# Patient Record
Sex: MALE | Race: WHITE | NOT HISPANIC OR LATINO | Employment: OTHER | URBAN - METROPOLITAN AREA
[De-identification: names, ages, dates, MRNs, and addresses within clinical notes are randomized per-mention and may not be internally consistent; named-entity substitution may affect disease eponyms.]

---

## 2017-01-01 ENCOUNTER — APPOINTMENT (OUTPATIENT)
Dept: GENERAL RADIOLOGY | Facility: HOSPITAL | Age: 82
End: 2017-01-01

## 2017-01-01 ENCOUNTER — OFFICE VISIT (OUTPATIENT)
Dept: ONCOLOGY | Facility: CLINIC | Age: 82
End: 2017-01-01

## 2017-01-01 ENCOUNTER — APPOINTMENT (OUTPATIENT)
Dept: ONCOLOGY | Facility: HOSPITAL | Age: 82
End: 2017-01-01

## 2017-01-01 ENCOUNTER — DOCUMENTATION (OUTPATIENT)
Dept: INTERNAL MEDICINE | Facility: HOSPITAL | Age: 82
End: 2017-01-01

## 2017-01-01 ENCOUNTER — INFUSION (OUTPATIENT)
Dept: ONCOLOGY | Facility: HOSPITAL | Age: 82
End: 2017-01-01

## 2017-01-01 ENCOUNTER — LAB (OUTPATIENT)
Dept: ONCOLOGY | Facility: HOSPITAL | Age: 82
End: 2017-01-01

## 2017-01-01 ENCOUNTER — TELEPHONE (OUTPATIENT)
Dept: INTERNAL MEDICINE | Facility: CLINIC | Age: 82
End: 2017-01-01

## 2017-01-01 ENCOUNTER — DOCUMENTATION (OUTPATIENT)
Dept: ONCOLOGY | Facility: CLINIC | Age: 82
End: 2017-01-01

## 2017-01-01 ENCOUNTER — HOSPITAL ENCOUNTER (OUTPATIENT)
Dept: CT IMAGING | Facility: HOSPITAL | Age: 82
Discharge: HOME | End: 2017-03-10
Admitting: NURSE PRACTITIONER

## 2017-01-01 ENCOUNTER — HOSPITAL ENCOUNTER (INPATIENT)
Facility: HOSPITAL | Age: 82
LOS: 1 days | End: 2017-05-05
Attending: EMERGENCY MEDICINE | Admitting: INTERNAL MEDICINE

## 2017-01-01 ENCOUNTER — TELEPHONE (OUTPATIENT)
Dept: ONCOLOGY | Facility: CLINIC | Age: 82
End: 2017-01-01

## 2017-01-01 VITALS
WEIGHT: 141.8 LBS | TEMPERATURE: 98.3 F | SYSTOLIC BLOOD PRESSURE: 128 MMHG | HEART RATE: 76 BPM | DIASTOLIC BLOOD PRESSURE: 82 MMHG | RESPIRATION RATE: 16 BRPM | BODY MASS INDEX: 21 KG/M2 | HEIGHT: 69 IN

## 2017-01-01 VITALS
BODY MASS INDEX: 21.03 KG/M2 | HEART RATE: 90 BPM | DIASTOLIC BLOOD PRESSURE: 77 MMHG | WEIGHT: 142 LBS | TEMPERATURE: 97.6 F | RESPIRATION RATE: 18 BRPM | OXYGEN SATURATION: 98 % | HEIGHT: 69 IN | SYSTOLIC BLOOD PRESSURE: 127 MMHG

## 2017-01-01 VITALS
WEIGHT: 140 LBS | OXYGEN SATURATION: 65 % | HEIGHT: 70 IN | HEART RATE: 60 BPM | BODY MASS INDEX: 20.04 KG/M2 | SYSTOLIC BLOOD PRESSURE: 105 MMHG | RESPIRATION RATE: 20 BRPM | TEMPERATURE: 99.1 F | DIASTOLIC BLOOD PRESSURE: 49 MMHG

## 2017-01-01 VITALS
HEIGHT: 69 IN | SYSTOLIC BLOOD PRESSURE: 108 MMHG | BODY MASS INDEX: 20.88 KG/M2 | DIASTOLIC BLOOD PRESSURE: 70 MMHG | WEIGHT: 141 LBS | OXYGEN SATURATION: 95 % | HEART RATE: 84 BPM | RESPIRATION RATE: 20 BRPM | TEMPERATURE: 97.6 F

## 2017-01-01 VITALS
BODY MASS INDEX: 21.55 KG/M2 | HEART RATE: 85 BPM | DIASTOLIC BLOOD PRESSURE: 82 MMHG | TEMPERATURE: 98.2 F | SYSTOLIC BLOOD PRESSURE: 134 MMHG | RESPIRATION RATE: 16 BRPM | HEIGHT: 69 IN | WEIGHT: 145.5 LBS

## 2017-01-01 VITALS
HEIGHT: 69 IN | RESPIRATION RATE: 18 BRPM | SYSTOLIC BLOOD PRESSURE: 147 MMHG | HEART RATE: 73 BPM | TEMPERATURE: 97.4 F | OXYGEN SATURATION: 95 % | BODY MASS INDEX: 21.37 KG/M2 | DIASTOLIC BLOOD PRESSURE: 92 MMHG | WEIGHT: 144.3 LBS

## 2017-01-01 VITALS
BODY MASS INDEX: 21.15 KG/M2 | HEART RATE: 81 BPM | DIASTOLIC BLOOD PRESSURE: 80 MMHG | OXYGEN SATURATION: 90 % | HEIGHT: 69 IN | SYSTOLIC BLOOD PRESSURE: 132 MMHG | TEMPERATURE: 97.3 F | WEIGHT: 142.8 LBS | RESPIRATION RATE: 16 BRPM

## 2017-01-01 VITALS — WEIGHT: 140.2 LBS | BODY MASS INDEX: 20.69 KG/M2

## 2017-01-01 VITALS
SYSTOLIC BLOOD PRESSURE: 128 MMHG | DIASTOLIC BLOOD PRESSURE: 76 MMHG | WEIGHT: 141.8 LBS | TEMPERATURE: 97.8 F | HEART RATE: 88 BPM | BODY MASS INDEX: 20.93 KG/M2

## 2017-01-01 DIAGNOSIS — C34.91 NON-SMALL CELL CANCER OF RIGHT LUNG (HCC): Primary | ICD-10-CM

## 2017-01-01 DIAGNOSIS — D64.9 ANEMIA, UNSPECIFIED TYPE: Primary | ICD-10-CM

## 2017-01-01 DIAGNOSIS — G89.3 CANCER ASSOCIATED PAIN: ICD-10-CM

## 2017-01-01 DIAGNOSIS — C34.91 NON-SMALL CELL CANCER OF RIGHT LUNG (HCC): ICD-10-CM

## 2017-01-01 DIAGNOSIS — C34.91 MALIGNANT NEOPLASM OF RIGHT LUNG, UNSPECIFIED PART OF LUNG (HCC): ICD-10-CM

## 2017-01-01 DIAGNOSIS — C34.11 MALIGNANT NEOPLASM OF UPPER LOBE OF RIGHT LUNG (HCC): ICD-10-CM

## 2017-01-01 DIAGNOSIS — C34.11 MALIGNANT NEOPLASM OF UPPER LOBE OF RIGHT LUNG (HCC): Primary | ICD-10-CM

## 2017-01-01 DIAGNOSIS — J96.21 ACUTE ON CHRONIC RESPIRATORY FAILURE WITH HYPOXIA (HCC): Primary | ICD-10-CM

## 2017-01-01 DIAGNOSIS — R53.82 CHRONIC FATIGUE: ICD-10-CM

## 2017-01-01 DIAGNOSIS — C61 MALIGNANT NEOPLASM OF PROSTATE (HCC): Primary | ICD-10-CM

## 2017-01-01 DIAGNOSIS — R19.7 DIARRHEA, UNSPECIFIED TYPE: Primary | ICD-10-CM

## 2017-01-01 DIAGNOSIS — D64.9 ANEMIA, UNSPECIFIED TYPE: ICD-10-CM

## 2017-01-01 DIAGNOSIS — R63.4 UNINTENTIONAL WEIGHT LOSS: ICD-10-CM

## 2017-01-01 DIAGNOSIS — J18.9 PNEUMONIA OF LEFT LOWER LOBE DUE TO INFECTIOUS ORGANISM: ICD-10-CM

## 2017-01-01 DIAGNOSIS — R79.1 ABNORMAL COAGULATION PROFILE: ICD-10-CM

## 2017-01-01 LAB
ALBUMIN SERPL-MCNC: 3.6 G/DL (ref 3.5–5.2)
ALBUMIN SERPL-MCNC: 3.6 G/DL (ref 3.5–5.2)
ALBUMIN SERPL-MCNC: 3.7 G/DL (ref 3.5–5.2)
ALBUMIN SERPL-MCNC: 3.8 G/DL (ref 3.5–5.2)
ALBUMIN SERPL-MCNC: 3.9 G/DL (ref 3.5–5.2)
ALBUMIN SERPL-MCNC: 4.1 G/DL (ref 3.5–5.2)
ALBUMIN/GLOB SERPL: 0.9 G/DL
ALBUMIN/GLOB SERPL: 1 G/DL
ALBUMIN/GLOB SERPL: 1 G/DL (ref 1.1–2.4)
ALBUMIN/GLOB SERPL: 1.1 G/DL
ALP SERPL-CCNC: 65 U/L (ref 39–117)
ALP SERPL-CCNC: 82 U/L (ref 39–117)
ALP SERPL-CCNC: 84 U/L (ref 39–117)
ALP SERPL-CCNC: 86 U/L (ref 39–117)
ALP SERPL-CCNC: 87 U/L (ref 38–116)
ALP SERPL-CCNC: 87 U/L (ref 39–117)
ALP SERPL-CCNC: 89 U/L (ref 39–117)
ALP SERPL-CCNC: 90 U/L (ref 39–117)
ALT SERPL W P-5'-P-CCNC: 11 U/L (ref 1–41)
ALT SERPL W P-5'-P-CCNC: 13 U/L (ref 0–41)
ALT SERPL W P-5'-P-CCNC: 13 U/L (ref 1–41)
ALT SERPL W P-5'-P-CCNC: 14 U/L (ref 1–41)
ALT SERPL W P-5'-P-CCNC: 15 U/L (ref 1–41)
ALT SERPL W P-5'-P-CCNC: 15 U/L (ref 1–41)
ANION GAP SERPL CALCULATED.3IONS-SCNC: 11 MMOL/L
ANION GAP SERPL CALCULATED.3IONS-SCNC: 11.1 MMOL/L
ANION GAP SERPL CALCULATED.3IONS-SCNC: 12.7 MMOL/L
ANION GAP SERPL CALCULATED.3IONS-SCNC: 13.3 MMOL/L
ANION GAP SERPL CALCULATED.3IONS-SCNC: 13.9 MMOL/L
ANION GAP SERPL CALCULATED.3IONS-SCNC: 16.3 MMOL/L
ANION GAP SERPL CALCULATED.3IONS-SCNC: 8.6 MMOL/L
ANION GAP SERPL CALCULATED.3IONS-SCNC: 9.8 MMOL/L
ARTERIAL PATENCY WRIST A: POSITIVE
ARTERIAL PATENCY WRIST A: POSITIVE
AST SERPL-CCNC: 13 U/L (ref 1–40)
AST SERPL-CCNC: 14 U/L (ref 0–40)
AST SERPL-CCNC: 15 U/L (ref 1–40)
AST SERPL-CCNC: 16 U/L (ref 1–40)
AST SERPL-CCNC: 18 U/L (ref 1–40)
AST SERPL-CCNC: 20 U/L (ref 1–40)
AST SERPL-CCNC: 20 U/L (ref 1–40)
AST SERPL-CCNC: 21 U/L (ref 1–40)
ATMOSPHERIC PRESS: 738.8 MMHG
ATMOSPHERIC PRESS: 738.8 MMHG
BASE EXCESS BLDA CALC-SCNC: -2 MMOL/L (ref 0–2)
BASE EXCESS BLDA CALC-SCNC: 1.3 MMOL/L (ref 0–2)
BASOPHILS # BLD AUTO: 0.03 10*3/MM3 (ref 0–0.2)
BASOPHILS # BLD AUTO: 0.04 10*3/MM3 (ref 0–0.2)
BASOPHILS # BLD AUTO: 0.05 10*3/MM3 (ref 0–0.2)
BASOPHILS # BLD AUTO: 0.06 10*3/MM3 (ref 0–0.1)
BASOPHILS # BLD AUTO: 0.08 10*3/MM3 (ref 0–0.2)
BASOPHILS # BLD AUTO: 0.1 10*3/MM3 (ref 0–0.2)
BASOPHILS NFR BLD AUTO: 0.2 % (ref 0–1.5)
BASOPHILS NFR BLD AUTO: 0.3 % (ref 0–1.5)
BASOPHILS NFR BLD AUTO: 0.5 % (ref 0–1.5)
BASOPHILS NFR BLD AUTO: 0.6 % (ref 0–1.5)
BASOPHILS NFR BLD AUTO: 0.7 % (ref 0–1.1)
BASOPHILS NFR BLD AUTO: 0.7 % (ref 0–1.5)
BASOPHILS NFR BLD AUTO: 1.1 % (ref 0–1.5)
BASOPHILS NFR BLD AUTO: 1.1 % (ref 0–1.5)
BDY SITE: ABNORMAL
BDY SITE: ABNORMAL
BILIRUB SERPL-MCNC: 0.5 MG/DL (ref 0.1–1.2)
BILIRUB SERPL-MCNC: 0.7 MG/DL (ref 0.1–1.2)
BILIRUB UR QL STRIP: NEGATIVE
BUN BLD-MCNC: 23 MG/DL (ref 8–23)
BUN BLD-MCNC: 23 MG/DL (ref 8–23)
BUN BLD-MCNC: 24 MG/DL (ref 8–23)
BUN BLD-MCNC: 25 MG/DL (ref 6–20)
BUN BLD-MCNC: 26 MG/DL (ref 8–23)
BUN BLD-MCNC: 27 MG/DL (ref 8–23)
BUN BLD-MCNC: 27 MG/DL (ref 8–23)
BUN BLD-MCNC: 28 MG/DL (ref 8–23)
BUN/CREAT SERPL: 19.3 (ref 7–25)
BUN/CREAT SERPL: 21.1 (ref 7–25)
BUN/CREAT SERPL: 23 (ref 7–25)
BUN/CREAT SERPL: 23.3 (ref 7–25)
BUN/CREAT SERPL: 23.4 (ref 7.3–30)
BUN/CREAT SERPL: 24.6 (ref 7–25)
BUN/CREAT SERPL: 24.8 (ref 7–25)
BUN/CREAT SERPL: 25.7 (ref 7–25)
CALCIUM SPEC-SCNC: 9.3 MG/DL (ref 8.6–10.5)
CALCIUM SPEC-SCNC: 9.4 MG/DL (ref 8.6–10.5)
CALCIUM SPEC-SCNC: 9.5 MG/DL (ref 8.6–10.5)
CALCIUM SPEC-SCNC: 9.6 MG/DL (ref 8.6–10.5)
CALCIUM SPEC-SCNC: 9.7 MG/DL (ref 8.6–10.5)
CALCIUM SPEC-SCNC: 9.9 MG/DL (ref 8.5–10.2)
CHLORIDE SERPL-SCNC: 93 MMOL/L (ref 98–107)
CHLORIDE SERPL-SCNC: 94 MMOL/L (ref 98–107)
CHLORIDE SERPL-SCNC: 94 MMOL/L (ref 98–107)
CHLORIDE SERPL-SCNC: 95 MMOL/L (ref 98–107)
CHLORIDE SERPL-SCNC: 96 MMOL/L (ref 98–107)
CHLORIDE SERPL-SCNC: 96 MMOL/L (ref 98–107)
CHLORIDE SERPL-SCNC: 98 MMOL/L (ref 98–107)
CHLORIDE SERPL-SCNC: 98 MMOL/L (ref 98–107)
CLARITY UR: CLEAR
CO2 SERPL-SCNC: 27.3 MMOL/L (ref 22–29)
CO2 SERPL-SCNC: 27.7 MMOL/L (ref 22–29)
CO2 SERPL-SCNC: 29.1 MMOL/L (ref 22–29)
CO2 SERPL-SCNC: 29.7 MMOL/L (ref 22–29)
CO2 SERPL-SCNC: 29.9 MMOL/L (ref 22–29)
CO2 SERPL-SCNC: 30.2 MMOL/L (ref 22–29)
CO2 SERPL-SCNC: 31.4 MMOL/L (ref 22–29)
CO2 SERPL-SCNC: 33 MMOL/L (ref 22–29)
COLOR UR: YELLOW
CREAT BLD-MCNC: 1.03 MG/DL (ref 0.76–1.27)
CREAT BLD-MCNC: 1.05 MG/DL (ref 0.76–1.27)
CREAT BLD-MCNC: 1.07 MG/DL (ref 0.7–1.3)
CREAT BLD-MCNC: 1.09 MG/DL (ref 0.76–1.27)
CREAT BLD-MCNC: 1.09 MG/DL (ref 0.76–1.27)
CREAT BLD-MCNC: 1.13 MG/DL (ref 0.76–1.27)
CREAT BLD-MCNC: 1.14 MG/DL (ref 0.76–1.27)
CREAT BLD-MCNC: 1.19 MG/DL (ref 0.76–1.27)
CREAT BLDA-MCNC: 1.3 MG/DL (ref 0.6–1.3)
D-LACTATE SERPL-SCNC: 4.1 MMOL/L (ref 0.5–2)
DEPRECATED RDW RBC AUTO: 51.8 FL (ref 37–54)
DEPRECATED RDW RBC AUTO: 52.9 FL (ref 37–54)
DEPRECATED RDW RBC AUTO: 53.1 FL (ref 37–54)
DEPRECATED RDW RBC AUTO: 53.2 FL (ref 37–54)
DEPRECATED RDW RBC AUTO: 53.6 FL (ref 37–54)
DEPRECATED RDW RBC AUTO: 54.9 FL (ref 37–49)
DEPRECATED RDW RBC AUTO: 54.9 FL (ref 37–54)
DEPRECATED RDW RBC AUTO: 56 FL (ref 37–54)
EOSINOPHIL # BLD AUTO: 0.01 10*3/MM3 (ref 0–0.7)
EOSINOPHIL # BLD AUTO: 0.02 10*3/MM3 (ref 0–0.7)
EOSINOPHIL # BLD AUTO: 0.02 10*3/MM3 (ref 0–0.7)
EOSINOPHIL # BLD AUTO: 0.04 10*3/MM3 (ref 0–0.7)
EOSINOPHIL # BLD AUTO: 0.08 10*3/MM3 (ref 0–0.7)
EOSINOPHIL # BLD AUTO: 0.09 10*3/MM3 (ref 0–0.36)
EOSINOPHIL # BLD AUTO: 0.13 10*3/MM3 (ref 0–0.7)
EOSINOPHIL # BLD AUTO: 0.15 10*3/MM3 (ref 0–0.7)
EOSINOPHIL NFR BLD AUTO: 0.1 % (ref 0.3–6.2)
EOSINOPHIL NFR BLD AUTO: 0.3 % (ref 0.3–6.2)
EOSINOPHIL NFR BLD AUTO: 1.1 % (ref 1–5)
EOSINOPHIL NFR BLD AUTO: 1.3 % (ref 0.3–6.2)
EOSINOPHIL NFR BLD AUTO: 1.5 % (ref 0.3–6.2)
EOSINOPHIL NFR BLD AUTO: 2 % (ref 0.3–6.2)
ERYTHROCYTE [DISTWIDTH] IN BLOOD BY AUTOMATED COUNT: 14.7 % (ref 11.5–14.5)
ERYTHROCYTE [DISTWIDTH] IN BLOOD BY AUTOMATED COUNT: 15.2 % (ref 11.5–14.5)
ERYTHROCYTE [DISTWIDTH] IN BLOOD BY AUTOMATED COUNT: 15.3 % (ref 11.5–14.5)
ERYTHROCYTE [DISTWIDTH] IN BLOOD BY AUTOMATED COUNT: 15.4 % (ref 11.5–14.5)
ERYTHROCYTE [DISTWIDTH] IN BLOOD BY AUTOMATED COUNT: 15.4 % (ref 11.5–14.5)
ERYTHROCYTE [DISTWIDTH] IN BLOOD BY AUTOMATED COUNT: 15.6 % (ref 11.7–14.5)
ERYTHROCYTE [DISTWIDTH] IN BLOOD BY AUTOMATED COUNT: 15.7 % (ref 11.5–14.5)
ERYTHROCYTE [DISTWIDTH] IN BLOOD BY AUTOMATED COUNT: 15.7 % (ref 11.5–14.5)
GFR SERPL CREATININE-BSD FRML MDRD: 58 ML/MIN/1.73
GFR SERPL CREATININE-BSD FRML MDRD: 61 ML/MIN/1.73
GFR SERPL CREATININE-BSD FRML MDRD: 62 ML/MIN/1.73
GFR SERPL CREATININE-BSD FRML MDRD: 64 ML/MIN/1.73
GFR SERPL CREATININE-BSD FRML MDRD: 64 ML/MIN/1.73
GFR SERPL CREATININE-BSD FRML MDRD: 66 ML/MIN/1.73
GFR SERPL CREATININE-BSD FRML MDRD: 67 ML/MIN/1.73
GFR SERPL CREATININE-BSD FRML MDRD: 68 ML/MIN/1.73
GLOBULIN UR ELPH-MCNC: 3.4 GM/DL
GLOBULIN UR ELPH-MCNC: 3.5 GM/DL
GLOBULIN UR ELPH-MCNC: 3.6 GM/DL
GLOBULIN UR ELPH-MCNC: 3.7 GM/DL
GLOBULIN UR ELPH-MCNC: 3.8 GM/DL (ref 1.8–3.5)
GLOBULIN UR ELPH-MCNC: 4.1 GM/DL
GLUCOSE BLD-MCNC: 115 MG/DL (ref 65–99)
GLUCOSE BLD-MCNC: 117 MG/DL (ref 65–99)
GLUCOSE BLD-MCNC: 118 MG/DL (ref 65–99)
GLUCOSE BLD-MCNC: 136 MG/DL (ref 65–99)
GLUCOSE BLD-MCNC: 138 MG/DL (ref 65–99)
GLUCOSE BLD-MCNC: 161 MG/DL (ref 74–124)
GLUCOSE BLD-MCNC: 186 MG/DL (ref 65–99)
GLUCOSE BLD-MCNC: 93 MG/DL (ref 65–99)
GLUCOSE UR STRIP-MCNC: NEGATIVE MG/DL
HCO3 BLDA-SCNC: 26.8 MMOL/L (ref 22–28)
HCO3 BLDA-SCNC: 26.9 MMOL/L (ref 22–28)
HCT VFR BLD AUTO: 33.3 % (ref 40.4–52.2)
HCT VFR BLD AUTO: 34.3 % (ref 40.4–52.2)
HCT VFR BLD AUTO: 34.3 % (ref 40–49)
HCT VFR BLD AUTO: 34.5 % (ref 40.4–52.2)
HCT VFR BLD AUTO: 35.1 % (ref 40.4–52.2)
HCT VFR BLD AUTO: 36.3 % (ref 40.4–52.2)
HCT VFR BLD AUTO: 39.1 % (ref 40.4–52.2)
HCT VFR BLD AUTO: 39.3 % (ref 40.4–52.2)
HGB BLD-MCNC: 10.8 G/DL (ref 13.5–16.5)
HGB BLD-MCNC: 10.9 G/DL (ref 13.7–17.6)
HGB BLD-MCNC: 10.9 G/DL (ref 13.7–17.6)
HGB BLD-MCNC: 11 G/DL (ref 13.7–17.6)
HGB BLD-MCNC: 11.1 G/DL (ref 13.7–17.6)
HGB BLD-MCNC: 11.7 G/DL (ref 13.7–17.6)
HGB BLD-MCNC: 12.5 G/DL (ref 13.7–17.6)
HGB BLD-MCNC: 12.7 G/DL (ref 13.7–17.6)
HGB UR QL STRIP.AUTO: NEGATIVE
HOLD SPECIMEN: NORMAL
HOLD SPECIMEN: NORMAL
HOROWITZ INDEX BLD+IHG-RTO: 100 %
HOROWITZ INDEX BLD+IHG-RTO: 60 %
IMM GRANULOCYTES # BLD: 0.03 10*3/MM3 (ref 0–0.03)
IMM GRANULOCYTES # BLD: 0.04 10*3/MM3 (ref 0–0.03)
IMM GRANULOCYTES # BLD: 0.05 10*3/MM3 (ref 0–0.03)
IMM GRANULOCYTES # BLD: 0.07 10*3/MM3 (ref 0–0.03)
IMM GRANULOCYTES NFR BLD: 0.3 % (ref 0–0.5)
IMM GRANULOCYTES NFR BLD: 0.4 % (ref 0–0.5)
IMM GRANULOCYTES NFR BLD: 0.5 % (ref 0–0.5)
IMM GRANULOCYTES NFR BLD: 0.6 % (ref 0–0.5)
INR PPP: 2.64 (ref 0.9–1.1)
KETONES UR QL STRIP: NEGATIVE
LEUKOCYTE ESTERASE UR QL STRIP.AUTO: NEGATIVE
LYMPHOCYTES # BLD AUTO: 0.61 10*3/MM3 (ref 0.9–4.8)
LYMPHOCYTES # BLD AUTO: 0.9 10*3/MM3 (ref 0.9–4.8)
LYMPHOCYTES # BLD AUTO: 1.06 10*3/MM3 (ref 0.9–4.8)
LYMPHOCYTES # BLD AUTO: 1.11 10*3/MM3 (ref 1–3.5)
LYMPHOCYTES # BLD AUTO: 1.2 10*3/MM3 (ref 0.9–4.8)
LYMPHOCYTES # BLD AUTO: 1.22 10*3/MM3 (ref 0.9–4.8)
LYMPHOCYTES # BLD AUTO: 1.31 10*3/MM3 (ref 0.9–4.8)
LYMPHOCYTES # BLD AUTO: 1.36 10*3/MM3 (ref 0.9–4.8)
LYMPHOCYTES NFR BLD AUTO: 13.7 % (ref 20–49)
LYMPHOCYTES NFR BLD AUTO: 14.2 % (ref 19.6–45.3)
LYMPHOCYTES NFR BLD AUTO: 14.3 % (ref 19.6–45.3)
LYMPHOCYTES NFR BLD AUTO: 15 % (ref 19.6–45.3)
LYMPHOCYTES NFR BLD AUTO: 16.6 % (ref 19.6–45.3)
LYMPHOCYTES NFR BLD AUTO: 18.4 % (ref 19.6–45.3)
LYMPHOCYTES NFR BLD AUTO: 5 % (ref 19.6–45.3)
LYMPHOCYTES NFR BLD AUTO: 7.9 % (ref 19.6–45.3)
MCH RBC QN AUTO: 29.8 PG (ref 27–32.7)
MCH RBC QN AUTO: 30.3 PG (ref 27–32.7)
MCH RBC QN AUTO: 30.3 PG (ref 27–33)
MCH RBC QN AUTO: 30.4 PG (ref 27–32.7)
MCH RBC QN AUTO: 30.5 PG (ref 27–32.7)
MCH RBC QN AUTO: 30.8 PG (ref 27–32.7)
MCHC RBC AUTO-ENTMCNC: 31.3 G/DL (ref 32.6–36.4)
MCHC RBC AUTO-ENTMCNC: 31.5 G/DL (ref 32–35)
MCHC RBC AUTO-ENTMCNC: 31.8 G/DL (ref 32.6–36.4)
MCHC RBC AUTO-ENTMCNC: 31.8 G/DL (ref 32.6–36.4)
MCHC RBC AUTO-ENTMCNC: 32.2 G/DL (ref 32.6–36.4)
MCHC RBC AUTO-ENTMCNC: 32.2 G/DL (ref 32.6–36.4)
MCHC RBC AUTO-ENTMCNC: 32.5 G/DL (ref 32.6–36.4)
MCHC RBC AUTO-ENTMCNC: 32.7 G/DL (ref 32.6–36.4)
MCV RBC AUTO: 93.3 FL (ref 79.8–96.2)
MCV RBC AUTO: 93.7 FL (ref 79.8–96.2)
MCV RBC AUTO: 94.5 FL (ref 79.8–96.2)
MCV RBC AUTO: 94.7 FL (ref 79.8–96.2)
MCV RBC AUTO: 94.8 FL (ref 79.8–96.2)
MCV RBC AUTO: 95.4 FL (ref 79.8–96.2)
MCV RBC AUTO: 96.1 FL (ref 83–97)
MCV RBC AUTO: 97.2 FL (ref 79.8–96.2)
MODALITY: ABNORMAL
MODALITY: ABNORMAL
MONOCYTES # BLD AUTO: 0.44 10*3/MM3 (ref 0.2–1.2)
MONOCYTES # BLD AUTO: 0.49 10*3/MM3 (ref 0.2–1.2)
MONOCYTES # BLD AUTO: 0.57 10*3/MM3 (ref 0.25–0.8)
MONOCYTES # BLD AUTO: 0.59 10*3/MM3 (ref 0.2–1.2)
MONOCYTES # BLD AUTO: 0.62 10*3/MM3 (ref 0.2–1.2)
MONOCYTES # BLD AUTO: 0.76 10*3/MM3 (ref 0.2–1.2)
MONOCYTES # BLD AUTO: 0.9 10*3/MM3 (ref 0.2–1.2)
MONOCYTES # BLD AUTO: 1.05 10*3/MM3 (ref 0.2–1.2)
MONOCYTES NFR BLD AUTO: 10.3 % (ref 5–12)
MONOCYTES NFR BLD AUTO: 12.1 % (ref 5–12)
MONOCYTES NFR BLD AUTO: 4.1 % (ref 5–12)
MONOCYTES NFR BLD AUTO: 6 % (ref 5–12)
MONOCYTES NFR BLD AUTO: 6.6 % (ref 5–12)
MONOCYTES NFR BLD AUTO: 7 % (ref 4–12)
MONOCYTES NFR BLD AUTO: 8 % (ref 5–12)
MONOCYTES NFR BLD AUTO: 8.6 % (ref 5–12)
NEUTROPHILS # BLD AUTO: 10.45 10*3/MM3 (ref 1.9–8.1)
NEUTROPHILS # BLD AUTO: 13.21 10*3/MM3 (ref 1.9–8.1)
NEUTROPHILS # BLD AUTO: 4.46 10*3/MM3 (ref 1.9–8.1)
NEUTROPHILS # BLD AUTO: 5.46 10*3/MM3 (ref 1.9–8.1)
NEUTROPHILS # BLD AUTO: 5.48 10*3/MM3 (ref 1.9–8.1)
NEUTROPHILS # BLD AUTO: 5.65 10*3/MM3 (ref 1.9–8.1)
NEUTROPHILS # BLD AUTO: 6.22 10*3/MM3 (ref 1.5–7)
NEUTROPHILS # BLD AUTO: 6.23 10*3/MM3 (ref 1.9–8.1)
NEUTROPHILS NFR BLD AUTO: 71.1 % (ref 42.7–76)
NEUTROPHILS NFR BLD AUTO: 71.5 % (ref 42.7–76)
NEUTROPHILS NFR BLD AUTO: 74.1 % (ref 42.7–76)
NEUTROPHILS NFR BLD AUTO: 74.2 % (ref 42.7–76)
NEUTROPHILS NFR BLD AUTO: 75.6 % (ref 42.7–76)
NEUTROPHILS NFR BLD AUTO: 77 % (ref 39–75)
NEUTROPHILS NFR BLD AUTO: 85.4 % (ref 42.7–76)
NEUTROPHILS NFR BLD AUTO: 87.2 % (ref 42.7–76)
NITRITE UR QL STRIP: NEGATIVE
NRBC BLD MANUAL-RTO: 0 /100 WBC (ref 0–0)
NT-PROBNP SERPL-MCNC: 1767 PG/ML (ref 0–1800)
O2 A-A PPRESDIFF RESPIRATORY: 0.1 MMHG
O2 A-A PPRESDIFF RESPIRATORY: 0.1 MMHG
PCO2 BLDA: 44.5 MM HG (ref 35–45)
PCO2 BLDA: 65.6 MM HG (ref 35–45)
PEEP RESPIRATORY: 20 CM[H2O]
PH BLDA: 7.22 PH UNITS (ref 7.35–7.45)
PH BLDA: 7.39 PH UNITS (ref 7.35–7.45)
PH UR STRIP.AUTO: 5.5 [PH] (ref 5–8)
PLATELET # BLD AUTO: 195 10*3/MM3 (ref 140–500)
PLATELET # BLD AUTO: 212 10*3/MM3 (ref 140–500)
PLATELET # BLD AUTO: 221 10*3/MM3 (ref 140–500)
PLATELET # BLD AUTO: 227 10*3/MM3 (ref 140–500)
PLATELET # BLD AUTO: 238 10*3/MM3 (ref 140–500)
PLATELET # BLD AUTO: 238 10*3/MM3 (ref 140–500)
PLATELET # BLD AUTO: 244 10*3/MM3 (ref 140–500)
PLATELET # BLD AUTO: 254 10*3/MM3 (ref 150–375)
PMV BLD AUTO: 10.5 FL (ref 6–12)
PMV BLD AUTO: 10.8 FL (ref 6–12)
PMV BLD AUTO: 9.1 FL (ref 6–12)
PMV BLD AUTO: 9.5 FL (ref 8.9–12.1)
PMV BLD AUTO: 9.6 FL (ref 6–12)
PMV BLD AUTO: 9.7 FL (ref 6–12)
PMV BLD AUTO: 9.7 FL (ref 6–12)
PMV BLD AUTO: 9.9 FL (ref 6–12)
PO2 BLDA: 37.5 MM HG (ref 80–100)
PO2 BLDA: 44.6 MM HG (ref 80–100)
POTASSIUM BLD-SCNC: 4.3 MMOL/L (ref 3.5–5.2)
POTASSIUM BLD-SCNC: 4.5 MMOL/L (ref 3.5–5.2)
POTASSIUM BLD-SCNC: 4.5 MMOL/L (ref 3.5–5.2)
POTASSIUM BLD-SCNC: 4.6 MMOL/L (ref 3.5–5.2)
POTASSIUM BLD-SCNC: 4.7 MMOL/L (ref 3.5–5.2)
POTASSIUM BLD-SCNC: 4.8 MMOL/L (ref 3.5–5.2)
POTASSIUM BLD-SCNC: 4.8 MMOL/L (ref 3.5–5.2)
POTASSIUM BLD-SCNC: 4.9 MMOL/L (ref 3.5–4.7)
PROT SERPL-MCNC: 7.2 G/DL (ref 6–8.5)
PROT SERPL-MCNC: 7.2 G/DL (ref 6–8.5)
PROT SERPL-MCNC: 7.3 G/DL (ref 6–8.5)
PROT SERPL-MCNC: 7.3 G/DL (ref 6–8.5)
PROT SERPL-MCNC: 7.5 G/DL (ref 6.3–8)
PROT SERPL-MCNC: 7.5 G/DL (ref 6–8.5)
PROT SERPL-MCNC: 7.8 G/DL (ref 6–8.5)
PROT SERPL-MCNC: 7.8 G/DL (ref 6–8.5)
PROT UR QL STRIP: ABNORMAL
PROTHROMBIN TIME: 27.3 SECONDS (ref 11.7–14.2)
RBC # BLD AUTO: 3.57 10*6/MM3 (ref 4.3–5.5)
RBC # BLD AUTO: 3.57 10*6/MM3 (ref 4.6–6)
RBC # BLD AUTO: 3.61 10*6/MM3 (ref 4.6–6)
RBC # BLD AUTO: 3.65 10*6/MM3 (ref 4.6–6)
RBC # BLD AUTO: 3.66 10*6/MM3 (ref 4.6–6)
RBC # BLD AUTO: 3.83 10*6/MM3 (ref 4.6–6)
RBC # BLD AUTO: 4.12 10*6/MM3 (ref 4.6–6)
RBC # BLD AUTO: 4.13 10*6/MM3 (ref 4.6–6)
SAO2 % BLDCOA: 58.7 % (ref 92–99)
SAO2 % BLDCOA: 79.4 % (ref 92–99)
SET MECH RESP RATE: 20
SET MECH RESP RATE: 20
SODIUM BLD-SCNC: 134 MMOL/L (ref 136–145)
SODIUM BLD-SCNC: 136 MMOL/L (ref 136–145)
SODIUM BLD-SCNC: 137 MMOL/L (ref 136–145)
SODIUM BLD-SCNC: 137 MMOL/L (ref 136–145)
SODIUM BLD-SCNC: 138 MMOL/L (ref 136–145)
SODIUM BLD-SCNC: 138 MMOL/L (ref 136–145)
SODIUM BLD-SCNC: 139 MMOL/L (ref 136–145)
SODIUM BLD-SCNC: 140 MMOL/L (ref 134–145)
SP GR UR STRIP: 1.01 (ref 1–1.03)
T4 FREE SERPL-MCNC: 1.04 NG/DL (ref 0.93–1.7)
T4 FREE SERPL-MCNC: 1.17 NG/DL (ref 0.93–1.7)
TOTAL RATE: 21 BREATHS/MINUTE
TROPONIN T SERPL-MCNC: 0.08 NG/ML (ref 0–0.03)
TSH SERPL DL<=0.05 MIU/L-ACNC: 2.78 MIU/ML (ref 0.27–4.2)
TSH SERPL DL<=0.05 MIU/L-ACNC: 3.75 MIU/ML (ref 0.27–4.2)
UROBILINOGEN UR QL STRIP: ABNORMAL
VENTILATOR MODE: AC
VT ON VENT VENT: 500 ML
WBC NRBC COR # BLD: 12.23 10*3/MM3 (ref 4.5–10.7)
WBC NRBC COR # BLD: 15.14 10*3/MM3 (ref 4.5–10.7)
WBC NRBC COR # BLD: 6.28 10*3/MM3 (ref 4.5–10.7)
WBC NRBC COR # BLD: 7.37 10*3/MM3 (ref 4.5–10.7)
WBC NRBC COR # BLD: 7.38 10*3/MM3 (ref 4.5–10.7)
WBC NRBC COR # BLD: 7.47 10*3/MM3 (ref 4.5–10.7)
WBC NRBC COR # BLD: 8.09 10*3/MM3 (ref 4–10)
WBC NRBC COR # BLD: 8.72 10*3/MM3 (ref 4.5–10.7)
WHOLE BLOOD HOLD SPECIMEN: NORMAL

## 2017-01-01 PROCEDURE — 25010000002 PEMBROLIZUMAB 100 MG/4ML SOLUTION 4 ML VIAL

## 2017-01-01 PROCEDURE — 99213 OFFICE O/P EST LOW 20 MIN: CPT | Performed by: INTERNAL MEDICINE

## 2017-01-01 PROCEDURE — 36415 COLL VENOUS BLD VENIPUNCTURE: CPT | Performed by: INTERNAL MEDICINE

## 2017-01-01 PROCEDURE — 96413 CHEMO IV INFUSION 1 HR: CPT | Performed by: INTERNAL MEDICINE

## 2017-01-01 PROCEDURE — 82565 ASSAY OF CREATININE: CPT

## 2017-01-01 PROCEDURE — 25010000002 PEMBROLIZUMAB 100 MG/4ML SOLUTION 4 ML VIAL: Performed by: INTERNAL MEDICINE

## 2017-01-01 PROCEDURE — 84443 ASSAY THYROID STIM HORMONE: CPT | Performed by: INTERNAL MEDICINE

## 2017-01-01 PROCEDURE — 25010000002 SUCCINYLCHOLINE PER 20 MG: Performed by: EMERGENCY MEDICINE

## 2017-01-01 PROCEDURE — 71010 HC CHEST PA OR AP: CPT

## 2017-01-01 PROCEDURE — 85025 COMPLETE CBC W/AUTO DIFF WBC: CPT | Performed by: INTERNAL MEDICINE

## 2017-01-01 PROCEDURE — 99214 OFFICE O/P EST MOD 30 MIN: CPT | Performed by: NURSE PRACTITIONER

## 2017-01-01 PROCEDURE — 36415 COLL VENOUS BLD VENIPUNCTURE: CPT

## 2017-01-01 PROCEDURE — 82803 BLOOD GASES ANY COMBINATION: CPT

## 2017-01-01 PROCEDURE — 84484 ASSAY OF TROPONIN QUANT: CPT | Performed by: EMERGENCY MEDICINE

## 2017-01-01 PROCEDURE — 84439 ASSAY OF FREE THYROXINE: CPT | Performed by: INTERNAL MEDICINE

## 2017-01-01 PROCEDURE — 25010000002 METHYLPREDNISOLONE PER 125 MG: Performed by: EMERGENCY MEDICINE

## 2017-01-01 PROCEDURE — 94002 VENT MGMT INPAT INIT DAY: CPT

## 2017-01-01 PROCEDURE — 94799 UNLISTED PULMONARY SVC/PX: CPT

## 2017-01-01 PROCEDURE — 5A1935Z RESPIRATORY VENTILATION, LESS THAN 24 CONSECUTIVE HOURS: ICD-10-PCS | Performed by: EMERGENCY MEDICINE

## 2017-01-01 PROCEDURE — 25010000002 PIPERACILLIN SOD-TAZOBACTAM PER 1 G: Performed by: EMERGENCY MEDICINE

## 2017-01-01 PROCEDURE — 96413 CHEMO IV INFUSION 1 HR: CPT | Performed by: NURSE PRACTITIONER

## 2017-01-01 PROCEDURE — 87186 SC STD MICRODIL/AGAR DIL: CPT | Performed by: INTERNAL MEDICINE

## 2017-01-01 PROCEDURE — 36600 WITHDRAWAL OF ARTERIAL BLOOD: CPT

## 2017-01-01 PROCEDURE — 93010 ELECTROCARDIOGRAM REPORT: CPT | Performed by: INTERNAL MEDICINE

## 2017-01-01 PROCEDURE — 99212-NC PR NO CHARGE CBC OFFICE OUTPATIENT VISIT 10 MINUTES: Performed by: NURSE PRACTITIONER

## 2017-01-01 PROCEDURE — 99215 OFFICE O/P EST HI 40 MIN: CPT | Performed by: INTERNAL MEDICINE

## 2017-01-01 PROCEDURE — 83605 ASSAY OF LACTIC ACID: CPT | Performed by: EMERGENCY MEDICINE

## 2017-01-01 PROCEDURE — 94660 CPAP INITIATION&MGMT: CPT

## 2017-01-01 PROCEDURE — 99285 EMERGENCY DEPT VISIT HI MDM: CPT

## 2017-01-01 PROCEDURE — 80053 COMPREHEN METABOLIC PANEL: CPT | Performed by: INTERNAL MEDICINE

## 2017-01-01 PROCEDURE — 25010000002 PEMBROLIZUMAB 100 MG/4ML SOLUTION 4 ML VIAL: Performed by: NURSE PRACTITIONER

## 2017-01-01 PROCEDURE — 87040 BLOOD CULTURE FOR BACTERIA: CPT | Performed by: INTERNAL MEDICINE

## 2017-01-01 PROCEDURE — 85025 COMPLETE CBC W/AUTO DIFF WBC: CPT | Performed by: NURSE PRACTITIONER

## 2017-01-01 PROCEDURE — 85610 PROTHROMBIN TIME: CPT | Performed by: EMERGENCY MEDICINE

## 2017-01-01 PROCEDURE — 93005 ELECTROCARDIOGRAM TRACING: CPT | Performed by: EMERGENCY MEDICINE

## 2017-01-01 PROCEDURE — 80053 COMPREHEN METABOLIC PANEL: CPT | Performed by: NURSE PRACTITIONER

## 2017-01-01 PROCEDURE — 51702 INSERT TEMP BLADDER CATH: CPT

## 2017-01-01 PROCEDURE — 99213 OFFICE O/P EST LOW 20 MIN: CPT | Performed by: NURSE PRACTITIONER

## 2017-01-01 PROCEDURE — 83880 ASSAY OF NATRIURETIC PEPTIDE: CPT | Performed by: EMERGENCY MEDICINE

## 2017-01-01 PROCEDURE — 0 IOPAMIDOL 61 % SOLUTION: Performed by: NURSE PRACTITIONER

## 2017-01-01 PROCEDURE — 80053 COMPREHEN METABOLIC PANEL: CPT | Performed by: EMERGENCY MEDICINE

## 2017-01-01 PROCEDURE — 31500 INSERT EMERGENCY AIRWAY: CPT | Performed by: EMERGENCY MEDICINE

## 2017-01-01 PROCEDURE — 85025 COMPLETE CBC W/AUTO DIFF WBC: CPT | Performed by: EMERGENCY MEDICINE

## 2017-01-01 PROCEDURE — 81003 URINALYSIS AUTO W/O SCOPE: CPT | Performed by: INTERNAL MEDICINE

## 2017-01-01 PROCEDURE — 71260 CT THORAX DX C+: CPT

## 2017-01-01 PROCEDURE — 0BH17EZ INSERTION OF ENDOTRACHEAL AIRWAY INTO TRACHEA, VIA NATURAL OR ARTIFICIAL OPENING: ICD-10-PCS | Performed by: EMERGENCY MEDICINE

## 2017-01-01 PROCEDURE — 25010000002 PROPOFOL 1000 MG/ML EMULSION: Performed by: EMERGENCY MEDICINE

## 2017-01-01 RX ORDER — SODIUM CHLORIDE 9 MG/ML
250 INJECTION, SOLUTION INTRAVENOUS ONCE
Status: COMPLETED | OUTPATIENT
Start: 2017-01-01 | End: 2017-01-01

## 2017-01-01 RX ORDER — SODIUM CHLORIDE 0.9 % (FLUSH) 0.9 %
10 SYRINGE (ML) INJECTION AS NEEDED
Status: DISCONTINUED | OUTPATIENT
Start: 2017-01-01 | End: 2017-01-01 | Stop reason: HOSPADM

## 2017-01-01 RX ORDER — METOPROLOL SUCCINATE 50 MG/1
TABLET, EXTENDED RELEASE ORAL
Qty: 90 TABLET | Refills: 0 | Status: SHIPPED | OUTPATIENT
Start: 2017-01-01

## 2017-01-01 RX ORDER — SUCCINYLCHOLINE CHLORIDE 20 MG/ML
100 INJECTION INTRAMUSCULAR; INTRAVENOUS ONCE
Status: COMPLETED | OUTPATIENT
Start: 2017-01-01 | End: 2017-01-01

## 2017-01-01 RX ORDER — ONDANSETRON 2 MG/ML
4 INJECTION INTRAMUSCULAR; INTRAVENOUS EVERY 6 HOURS PRN
Status: DISCONTINUED | OUTPATIENT
Start: 2017-01-01 | End: 2017-01-01 | Stop reason: HOSPADM

## 2017-01-01 RX ORDER — ACETAMINOPHEN 325 MG/1
650 TABLET ORAL EVERY 4 HOURS PRN
Status: DISCONTINUED | OUTPATIENT
Start: 2017-01-01 | End: 2017-01-01 | Stop reason: HOSPADM

## 2017-01-01 RX ORDER — SODIUM CHLORIDE 9 MG/ML
250 INJECTION, SOLUTION INTRAVENOUS ONCE
Status: CANCELLED | OUTPATIENT
Start: 2017-01-01

## 2017-01-01 RX ORDER — ONDANSETRON 4 MG/1
4 TABLET, FILM COATED ORAL EVERY 6 HOURS PRN
Status: DISCONTINUED | OUTPATIENT
Start: 2017-01-01 | End: 2017-01-01 | Stop reason: HOSPADM

## 2017-01-01 RX ORDER — ETOMIDATE 2 MG/ML
20 INJECTION INTRAVENOUS ONCE
Status: COMPLETED | OUTPATIENT
Start: 2017-01-01 | End: 2017-01-01

## 2017-01-01 RX ORDER — CALCIUM CARBONATE 200(500)MG
2 TABLET,CHEWABLE ORAL 2 TIMES DAILY PRN
Status: DISCONTINUED | OUTPATIENT
Start: 2017-01-01 | End: 2017-01-01 | Stop reason: HOSPADM

## 2017-01-01 RX ORDER — MAGNESIUM HYDROXIDE/ALUMINUM HYDROXICE/SIMETHICONE 120; 1200; 1200 MG/30ML; MG/30ML; MG/30ML
30 SUSPENSION ORAL EVERY 6 HOURS PRN
Status: DISCONTINUED | OUTPATIENT
Start: 2017-01-01 | End: 2017-01-01 | Stop reason: HOSPADM

## 2017-01-01 RX ORDER — ACETAMINOPHEN 650 MG/1
650 SUPPOSITORY RECTAL EVERY 4 HOURS PRN
Status: DISCONTINUED | OUTPATIENT
Start: 2017-01-01 | End: 2017-01-01 | Stop reason: HOSPADM

## 2017-01-01 RX ORDER — ONDANSETRON HYDROCHLORIDE 8 MG/1
8 TABLET, FILM COATED ORAL EVERY 8 HOURS PRN
Qty: 30 TABLET | Refills: 5 | Status: SHIPPED | OUTPATIENT
Start: 2017-01-01 | End: 2017-01-01

## 2017-01-01 RX ORDER — ONDANSETRON HYDROCHLORIDE 8 MG/1
8 TABLET, FILM COATED ORAL EVERY 8 HOURS PRN
Qty: 30 TABLET | Refills: 5 | Status: CANCELLED | OUTPATIENT
Start: 2017-01-01

## 2017-01-01 RX ORDER — FENTANYL CITRATE 50 UG/ML
50 INJECTION, SOLUTION INTRAMUSCULAR; INTRAVENOUS
Status: DISCONTINUED | OUTPATIENT
Start: 2017-01-01 | End: 2017-01-01 | Stop reason: HOSPADM

## 2017-01-01 RX ORDER — ALBUTEROL SULFATE 90 UG/1
6 AEROSOL, METERED RESPIRATORY (INHALATION)
Status: DISCONTINUED | OUTPATIENT
Start: 2017-01-01 | End: 2017-01-01 | Stop reason: HOSPADM

## 2017-01-01 RX ORDER — ALBUTEROL SULFATE 90 UG/1
AEROSOL, METERED RESPIRATORY (INHALATION)
Status: COMPLETED
Start: 2017-01-01 | End: 2017-01-01

## 2017-01-01 RX ORDER — HYDROCODONE BITARTRATE AND ACETAMINOPHEN 5; 325 MG/1; MG/1
1 TABLET ORAL EVERY 6 HOURS PRN
Qty: 30 TABLET | Refills: 0 | Status: SHIPPED | OUTPATIENT
Start: 2017-01-01 | End: 2017-01-01

## 2017-01-01 RX ORDER — BISACODYL 10 MG
10 SUPPOSITORY, RECTAL RECTAL DAILY PRN
Status: DISCONTINUED | OUTPATIENT
Start: 2017-01-01 | End: 2017-01-01 | Stop reason: HOSPADM

## 2017-01-01 RX ORDER — SODIUM CHLORIDE 0.9 % (FLUSH) 0.9 %
1-10 SYRINGE (ML) INJECTION AS NEEDED
Status: DISCONTINUED | OUTPATIENT
Start: 2017-01-01 | End: 2017-01-01 | Stop reason: HOSPADM

## 2017-01-01 RX ORDER — BISACODYL 5 MG/1
5 TABLET, DELAYED RELEASE ORAL DAILY PRN
Status: DISCONTINUED | OUTPATIENT
Start: 2017-01-01 | End: 2017-01-01 | Stop reason: HOSPADM

## 2017-01-01 RX ORDER — SODIUM CHLORIDE 9 MG/ML
100 INJECTION, SOLUTION INTRAVENOUS CONTINUOUS
Status: DISCONTINUED | OUTPATIENT
Start: 2017-01-01 | End: 2017-01-01 | Stop reason: HOSPADM

## 2017-01-01 RX ORDER — METHYLPREDNISOLONE SODIUM SUCCINATE 125 MG/2ML
125 INJECTION, POWDER, LYOPHILIZED, FOR SOLUTION INTRAMUSCULAR; INTRAVENOUS ONCE
Status: COMPLETED | OUTPATIENT
Start: 2017-01-01 | End: 2017-01-01

## 2017-01-01 RX ORDER — ONDANSETRON 4 MG/1
4 TABLET, ORALLY DISINTEGRATING ORAL EVERY 6 HOURS PRN
Status: DISCONTINUED | OUTPATIENT
Start: 2017-01-01 | End: 2017-01-01 | Stop reason: HOSPADM

## 2017-01-01 RX ORDER — IPRATROPIUM BROMIDE AND ALBUTEROL SULFATE 2.5; .5 MG/3ML; MG/3ML
3 SOLUTION RESPIRATORY (INHALATION) EVERY 6 HOURS PRN
Status: DISCONTINUED | OUTPATIENT
Start: 2017-01-01 | End: 2017-01-01 | Stop reason: HOSPADM

## 2017-01-01 RX ORDER — PANTOPRAZOLE SODIUM 40 MG/10ML
40 INJECTION, POWDER, LYOPHILIZED, FOR SOLUTION INTRAVENOUS
Status: DISCONTINUED | OUTPATIENT
Start: 2017-05-06 | End: 2017-01-01 | Stop reason: HOSPADM

## 2017-01-01 RX ADMIN — ALBUTEROL SULFATE 6 PUFF: 90 AEROSOL, METERED RESPIRATORY (INHALATION) at 15:06

## 2017-01-01 RX ADMIN — TAZOBACTAM SODIUM AND PIPERACILLIN SODIUM 4.5 G: 500; 4 INJECTION, SOLUTION INTRAVENOUS at 14:42

## 2017-01-01 RX ADMIN — SODIUM CHLORIDE 250 ML: 900 INJECTION, SOLUTION INTRAVENOUS at 11:45

## 2017-01-01 RX ADMIN — PROPOFOL 30 MCG/KG/MIN: 10 INJECTION, EMULSION INTRAVENOUS at 14:15

## 2017-01-01 RX ADMIN — SODIUM CHLORIDE 200 MG: 9 INJECTION, SOLUTION INTRAVENOUS at 11:13

## 2017-01-01 RX ADMIN — SODIUM CHLORIDE 250 ML: 900 INJECTION, SOLUTION INTRAVENOUS at 10:18

## 2017-01-01 RX ADMIN — METHYLPREDNISOLONE SODIUM SUCCINATE 125 MG: 125 INJECTION, POWDER, FOR SOLUTION INTRAMUSCULAR; INTRAVENOUS at 12:47

## 2017-01-01 RX ADMIN — SODIUM CHLORIDE 1905 ML: 9 INJECTION, SOLUTION INTRAVENOUS at 14:16

## 2017-01-01 RX ADMIN — SODIUM CHLORIDE 250 ML: 900 INJECTION, SOLUTION INTRAVENOUS at 11:13

## 2017-01-01 RX ADMIN — ETOMIDATE 20 MG: 2 INJECTION INTRAVENOUS at 14:04

## 2017-01-01 RX ADMIN — SUCCINYLCHOLINE CHLORIDE 100 MG: 20 INJECTION, SOLUTION INTRAMUSCULAR; INTRAVENOUS at 14:05

## 2017-01-01 RX ADMIN — SODIUM CHLORIDE 200 MG: 900 INJECTION, SOLUTION INTRAVENOUS at 11:56

## 2017-01-01 RX ADMIN — SODIUM CHLORIDE 200 MG: 9 INJECTION, SOLUTION INTRAVENOUS at 12:50

## 2017-01-01 RX ADMIN — SODIUM CHLORIDE 250 ML: 900 INJECTION, SOLUTION INTRAVENOUS at 11:40

## 2017-01-01 RX ADMIN — IOPAMIDOL 75 ML: 612 INJECTION, SOLUTION INTRAVENOUS at 09:40

## 2017-01-01 RX ADMIN — SODIUM CHLORIDE 250 ML: 900 INJECTION, SOLUTION INTRAVENOUS at 12:40

## 2017-01-01 RX ADMIN — SODIUM CHLORIDE 250 ML: 900 INJECTION, SOLUTION INTRAVENOUS at 10:27

## 2017-01-01 RX ADMIN — SODIUM CHLORIDE 200 MG: 9 INJECTION, SOLUTION INTRAVENOUS at 10:27

## 2017-01-01 RX ADMIN — SODIUM CHLORIDE 200 MG: 9 INJECTION, SOLUTION INTRAVENOUS at 11:44

## 2017-01-01 RX ADMIN — SODIUM CHLORIDE 250 ML/HR: 0.9 INJECTION, SOLUTION INTRAVENOUS at 14:43

## 2017-01-03 NOTE — PROGRESS NOTES
Subjective     PATIENT NAME:  Ángel Boyer  YOB: 1930  PATIENTS AGE:  86 y.o.  PATIENTS SEX:  male  DATE OF SERVICE:  01/03/2017  PROVIDER:  DONTE Walsh      ____________________PATIENT EDUCATION____________________    PATIENT EDUCATION:  Today I met with the patient to discuss the chemotherapy regimen recommended for treatment of his disease.  The patient was given explanation of treatment premed side effects including office policy that prohibits patients to drive if sedating medications are administered, MD explanation given regarding benefits, side effects, toxicities and goals of treatment.  The patient received a Chemotherapy/Biotherapy Plan Summary including diagnosis and specific treatment plan.    SIDE EFFECTS:  Common side effects were discussed with the patient and/or significant other.  Discussion included hair loss/discoloration, anemia/fatigue, infection/chills/fever, appetite, bleeding risk/precautions, constipation, diarrhea, mouth sores, taste alteration, loss of appetite,nausea/vomiting, peripheral neuropathy, skin/nail changes, rash, muscle aches/weakness, photosensitivity, weight gain/loss, hearing loss, dizziness, menopausal symptoms, menstrrual irregularity, sterility, high blood pressure, heart damage, liver damage, lung damage, kidney damage, DVT/PE risk, fluid retention, pleural/pericardial effusion, somnolence, electrolyte/LFT imbalance, vein exercises and/or the possible need for vascular access/port placement.  The patient was advice that although uncommon, leakage of an infused medication from the vein or venous access device (port) may lead to skin breakdown and/or other tissue damage.  The patient was advised that he/she may have pain, bleeding, and/or bruising from the insertion of a needle in their vein or venous access device (port).  The patient was further advised that, in spite of proper technique, infection with redness and irritation may  rarely occur at the site where the needle was inserted.  The patient was advised that if complications occur, additional medical treatment is available.    Discussion also included side effects specific to drugs in the treatment plan, specifically Keytruda.    Reproductive risks were discussed, including appropriate use of birth control and protection during sexual relations.    A total of 30 minutes were spent with the patient, with 100% of time spent in education and counseling.

## 2017-01-16 NOTE — PROGRESS NOTES
Subjective      REASONS FOR FOLLOWUP:    1. 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    2. The patient' s case was discussed at Multimodality Conference, consensus for radiation therapy only. This patient was seen by Dr. Roberson, undergoing radiation therapy, finished by 03/03/2016.    3. Mild anemia with no evidence of deficiency of iron, B12 or folic acid.    4. Repeated CT scan for chest with IV contrast on June 23 of 2016 reported a growing right lower lobe pulmonary nodule at 1.2 cm. The previously radiated pulmonary nodule in the right upper lobe has shrank in size.   5. Pneumonia/COPD exacerbation in November 2016 required hospitalization, CT scan showed disease progress in chest.   6. Had PET scan on 12/01/16, reporting local recurrence without metastatic disease.   7. Test of original tumor sample reported high-level expression of PD-L1 (Keytruda) at 90%. Liquid a biopsy was negative for drugable gene mutation.  8. Patient initiated on treatment with Keytruda on 1/9/2017.    HISTORY OF PRESENT ILLNESS:     History of Present Illness  Mr. Boyer is an 86-year-old male with the above-mentioned history, here today for toxicity check after receiving his first cycle of Keytruda on 1/9/2017.  He does not feel like he is had any adverse effects from the treatment.  He does report he feels a little more short of breath.  He is wearing oxygen at 2 L.  He denies fevers or chills.  He denies any issues with cough.  He has not required any increase in his oxygen requirements.  He denies issues with nausea, vomiting, constipation, or diarrhea.  He denies any skin rashes.  He does report some intermittent pain in his chest, which he previously discussed with Dr. Killian.  He is asking about something stronger than Tylenol for pain, as he occasionally has trouble sleeping as of the pain.    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   • Arthralgia of hip 03/17/2016   • Inguinal hernia 03/17/2016   • Abscess of lung 03/17/2016   • Malignant neoplasm of prostate 03/17/2016   • Sick sinus syndrome 03/17/2016   • Squamous cell carcinoma of skin 03/17/2016   • Non-small cell cancer of right lung 03/17/2016   • Essential hypertension 03/17/2016   • Chronic fatigue 09/15/2016   • Unintentional weight loss 09/15/2016   • Epigastric pain 09/15/2016   • Malignant neoplasm of upper lobe of right lung 11/17/2016   • Pulmonary mass 11/17/2016   • Right upper lobe pneumonia 11/17/2016   • Anemia 11/17/2016     Resolved Ambulatory Problems     Diagnosis Date Noted   • No Resolved Ambulatory Problems     Past Medical History   Diagnosis Date   • Cataract    • Chest pain    • Emphysema of lung    • Fall due to ice or snow    • Hearing deficit    • Hypertension    • Infectious viral hepatitis    • Neoplasm of uncertain behavior of skin 11/2014   • Pneumonia    • Rales    • Squamous cell carcinoma lung      Past Surgical History   Procedure Laterality Date   • Ear tubes Left    • Pacemaker implantation Left 2005   • Cataract extraction Left    • Cataract extraction Right    • Bronchoscopy  02/2013     right lung abscesses, negative for maligancy   • Hernia repair Right 12/2013     Inguinal hernia     Social History     Social History   • Marital status:      Spouse name: N/A   • Number of children: 5   • Years of education: 16     Occupational History   •  Retired     Social History Main Topics   • Smoking status: Former Smoker   • Smokeless tobacco: Never Used   • Alcohol use Yes      Comment: 1 drink per day    • Drug use: No   • Sexual activity: Defer     Other Topics Concern   • Not on file     Social History Narrative  "    Family History   Problem Relation Age of Onset   • Cancer Other    • Heart disease Other    • Breast cancer Sister 45   • Lung cancer Sister 72   • Heart disease Sister    • Cancer Sister 73     Colon cancer   • Lung cancer Brother 70   • Lung cancer Brother 75     Review of Systems   Constitutional: Positive for appetite change and fatigue. Negative for activity change, chills, fever and unexpected weight change.   HENT: Negative.  Negative for mouth sores, nosebleeds and trouble swallowing.    Respiratory: Positive for shortness of breath. Negative for cough.    Cardiovascular: Negative.  Negative for chest pain and leg swelling.   Gastrointestinal: Negative.  Negative for abdominal pain, constipation, diarrhea, nausea and vomiting.   Genitourinary: Negative.  Negative for difficulty urinating.   Musculoskeletal:        See HPI     Skin: Negative.  Negative for rash.   Neurological: Negative.    Hematological: Negative.  Negative for adenopathy. Does not bruise/bleed easily.   Psychiatric/Behavioral: Negative.           Medications:  The current medication list was reviewed in the EMR    ALLERGIES:  No Known Allergies    Objective      Vitals:    01/16/17 1122   BP: 132/80   Pulse: 81   Resp: 16   Temp: 97.3 °F (36.3 °C)   SpO2: 90%  Comment: 2L   Weight: 142 lb 12.8 oz (64.8 kg)   Height: 69.02\" (175.3 cm)   PainSc: 0-No pain  Comment: pain below rib cage at times, none at this time, rates a 5 when having pain     Current Status 1/16/2017   ECOG score 1       Physical Exam   Constitutional: He is oriented to person, place, and time. He appears well-developed. No distress.   Walking with a walker, wearing oxygen per nasal cannula at 2L.    HENT:   Head: Normocephalic and atraumatic.   Nose: Nose normal.   Eyes: Pupils are equal, round, and reactive to light.   Neck: Normal range of motion. Neck supple.   Cardiovascular: Normal rate, regular rhythm and normal heart sounds.    Pulmonary/Chest: Effort normal and " breath sounds normal. No respiratory distress. He has no decreased breath sounds. He has no wheezes. He has no rhonchi. He has no rales.   Abdominal: Soft. Normal appearance and bowel sounds are normal. He exhibits no distension. There is no hepatosplenomegaly. There is no tenderness.   Musculoskeletal: Normal range of motion. He exhibits no edema.   Neurological: He is alert and oriented to person, place, and time.   Skin: Skin is warm and dry.   Psychiatric: He has a normal mood and affect. His behavior is normal.   Nursing note and vitals reviewed.        RECENT LABS:  Hematology WBC   Date Value Ref Range Status   01/16/2017 7.47 4.50 - 10.70 10*3/mm3 Final   09/15/2016 7.98 4.50 - 10.70 10*3/mm3 Final     RBC   Date Value Ref Range Status   01/16/2017 3.61 (L) 4.60 - 6.00 10*6/mm3 Final   09/15/2016 3.84 (L) 4.60 - 6.00 10*6/mm3 Final     HEMOGLOBIN   Date Value Ref Range Status   01/16/2017 11.0 (L) 13.7 - 17.6 g/dL Final   09/15/2016 11.8 (L) 13.7 - 17.6 g/dL Final     HEMATOCRIT   Date Value Ref Range Status   01/16/2017 35.1 (L) 40.4 - 52.2 % Final   09/15/2016 35.9 (L) 40.4 - 52.2 % Final     PLATELETS   Date Value Ref Range Status   01/16/2017 227 140 - 500 10*3/mm3 Final   09/15/2016 263 140 - 500 10*3/mm3 Final            Lab Results   Component Value Date    GLUCOSE 118 (H) 01/16/2017    BUN 24 (H) 01/16/2017    CREATININE 1.03 01/16/2017    EGFRIFNONA 68 01/16/2017    EGFRIFAFRI 66 09/15/2016    BCR 23.3 01/16/2017    CO2 31.4 (H) 01/16/2017    CALCIUM 9.6 01/16/2017    PROTENTOTREF 7.7 09/15/2016    ALBUMIN 3.60 01/16/2017    LABIL2 1.0 01/16/2017    AST 15 01/16/2017    ALT 15 01/16/2017     Lab Results   Component Value Date     01/16/2017    K 4.5 01/16/2017    CL 98 01/16/2017    CO2 31.4 (H) 01/16/2017         Assessment/Plan   ASSESSMENT:    Recurrent squamous cell lung cancer in the right upper lobe with shotty mediastinal adenopathy. This patient had radiation therapy for the original  right upper lobe lesion finished in early March 2016. Repeated CT scan on 06/23/2016 reported shrinkage of this lesion. However CT scan for the chest on 11/17/2016 showed recurrent disease, with this previously irradiated lesion has grown significantly. Besides that, there is a separate mass growing inferior to this radiated mass.  We obtained a PET scan on 12/01/2016, which showed only localized disease in the right lung, without evidence of remote metastatic disease. His case was presented at the multi-mordality chest conference, when the result of PD-L1 tested was still pending. At that time because patient's comorbidities, he was not a candidate for surgery, nor repeating radiation therapy. However further pathology testing of the primary lung mass came back strongly positive for PD-L1 expression in the 90%.  His peripheral blood sample was tested negative for any drugable gene mutation by NGS method. With the newly published clinical trial data, immunotherapy with Keytruda showed better results comparing to first-line chemotherapy therapy in recurrent/locally advanced/metastatic disease. I think we should try in this patient as himself also prefers active treatment. I discussed with Dr. Mccauley, who concurs with my opinion.     Patient was initiated on treatment with Keytruda on 1/9/2017.  He seems to be tolerating the treatment fairly well thus far.  He has only noticed a mild increase in shortness of breath.  He inquired about how long he would receive this treatment.  I discussed he would receive it every 3 weeks until unacceptable toxicity, disease progression, or up to 24 months.  Patient will return in 2 weeks for follow-up with Dr. Killian, and in anticipation of his second cycle of treatment with Keytruda.       2. Anemia, relatively stable. Laboratory studies in November 2016 showed no evidence for deficiency of iron, B12, or folic acid. He does have stage III chronic renal insufficiency, but he is not a  candidate for Procrit injection at this point.    Hemoglobin today is 11.0.  We will continue to monitor    3. Cancer related pain: She has been using Tylenol which generally takes the edge off, however he is requesting something slightly stronger to help him sleep at night.  He was  prescribed hydrocodone 5/325, 1 every 6 hours as needed for pain #30 with no refills.          PLAN:    1. Return in 2 weeks for follow-up with Dr. Killian, and in anticipation of his second cycle of Keytruda.  2. Prescription given for hydrocodone 5/325, 1 every 6 hours as needed for pain #30, no refills.  3. Advised the patient to call for any concerns or worsening symptoms.              1/16/2017      CC:

## 2017-02-03 NOTE — PROGRESS NOTES
REASONS FOR FOLLOWUP:    1. 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    2. The patient' s case was discussed at Multimodality Conference, consensus for radiation therapy only. This patient was seen by Dr. Roberson, undergoing radiation therapy, finished by 03/03/2016.    3. Mild anemia with no evidence of deficiency of iron, B12 or folic acid.    4. Repeated CT scan for chest with IV contrast on June 23 of 2016 reported a growing right lower lobe pulmonary nodule at 1.2 cm. The previously radiated pulmonary nodule in the right upper lobe has shrank in size.   5. Pneumonia/COPD exacerbation in November 2016 required hospitalization, CT scan showed disease progress in chest. PET scan on 12/01/16, reporting local recurrence without metastatic disease.   6. Test of original tumor sample reported high-level expression of PD-L1 (Keytruda) at 90%. Liquid biopsy was negative for drugable gene mutation.  7. Patient initiated on treatment with Keytruda on 1/9/2017.     HISTORY OF PRESENT ILLNESS:   Mr. Boyer is an 86-year-old male presenting today for reevaluation, on day of his second cycle Keytruda treatment.  Patient reports no any side effects after first dose treatment.  He maintains at a baseline condition, with performance status is ECOG 2, stable respiratory condition on O2 supplementation at 2 L/m.  He is being ambulating at home and also mild exercise.  Patient reports no nausea vomiting, no diarrhea no constipation no skin rashes no pruritus.  Denies fevers sweating chills.     Patient reported that he quit smoking in the past month, and feels better, more energetic.    Denies pain.  Performance status ECOG 2.  Laboratory study today showed stable CBC results.          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   • Arthralgia of hip 03/17/2016   • Inguinal hernia 03/17/2016   • Abscess of lung 03/17/2016   • Malignant neoplasm of prostate 03/17/2016   • Sick sinus syndrome 03/17/2016   • Squamous cell carcinoma of skin 03/17/2016   • Non-small cell cancer of right lung 03/17/2016   • Essential hypertension 03/17/2016   • Chronic fatigue 09/15/2016   • Unintentional weight loss 09/15/2016   • Epigastric pain 09/15/2016   • Malignant neoplasm of upper lobe of right lung 11/17/2016   • Pulmonary mass 11/17/2016   • Right upper lobe pneumonia 11/17/2016   • Anemia 11/17/2016           Resolved Ambulatory Problems      Diagnosis Date Noted   • No Resolved Ambulatory Problems           Past Medical History   Diagnosis Date   • Cataract     • Chest pain     • Emphysema of lung     • Fall due to ice or snow     • Hearing deficit     • Hypertension     • Infectious viral hepatitis     • Neoplasm of uncertain behavior of skin 11/2014   • Pneumonia     • Rales     • Squamous cell carcinoma lung         Surgical History           Past Surgical History   Procedure Laterality Date   • Ear tubes Left     • Pacemaker implantation Left 2005   • Cataract extraction Left     • Cataract extraction Right     • Bronchoscopy   02/2013       right lung abscesses, negative for maligancy   • Hernia repair Right 12/2013       Inguinal hernia          Social History    Social History            Social History   • Marital status:        Spouse name: N/A   • Number of children: 5   • Years of education: 16           Occupational History   •   Retired              Social History Main Topics    • Smoking status: Former Smoker    • Smokeless tobacco: Never Used    • Alcohol use Yes         Comment: 1 drink per day     • Drug use: No    • Sexual activity: Defer            Other Topics Concern   • Not on file      Social History  "Narrative                Family History   Problem Relation Age of Onset   • Cancer Other     • Heart disease Other     • Breast cancer Sister 45   • Lung cancer Sister 72   • Heart disease Sister     • Cancer Sister 73       Colon cancer   • Lung cancer Brother 70   • Lung cancer Brother 75      Review of Systems   Constitutional:  Negative for activity change, chills, fever and unexpected weight change.   HENT: Negative. Negative for mouth sores, nosebleeds and trouble swallowing.   Respiratory: Positive for exertional shortness of breath, but no worse. Negative for cough.   Cardiovascular: Negative. Negative for chest pain and leg swelling.   Gastrointestinal: Negative. Negative for abdominal pain, constipation, diarrhea, nausea and vomiting.   Genitourinary: Negative. Negative for difficulty urinating.   Musculoskeletal: Chronic joint achiness.  Skin: Negative. Negative for rash.   Neurological: Negative.   Hematological: Negative. Negative for adenopathy. Does not bruise/bleed easily.   Psychiatric/Behavioral: Negative.            Medications: The current medication list was reviewed in the EMR     ALLERGIES: No Known Allergies     Objective      Vitals:    02/03/17 1117   BP: 147/92   Pulse: 73   Resp: 18   Temp: 97.4 °F (36.3 °C)   TempSrc: Oral   SpO2: 95%  Comment: 2L O2 conservative   Weight: 144 lb 4.8 oz (65.5 kg)   Height: 69.02\" (175.3 cm)   PainSc: 0-No pain   ECOG 2         Physical Exam   Constitutional:  He appears well-developed. No distress.   Walking with a walker, wearing oxygen per nasal cannula at 2L.    HENT:   Head: Normocephalic and atraumatic.   Nose: Nose normal.   Eyes: Pupils are equal, round, and reactive to light.   Neck: Normal range of motion. Neck supple.   Cardiovascular: Normal rate, regular rhythm and normal heart sounds.   Pulmonary/Chest: Diminished breathing sounds normal. No respiratory distress.  He has no wheezes, no rhonchi, no rales.   Abdominal: Soft. Normal appearance " and bowel sounds are normal. He exhibits no distension. There is no hepatosplenomegaly. There is no tenderness.   Musculoskeletal: Normal range of motion. He exhibits no edema.   Neurological: He is alert and oriented to person, place, and time.   Skin: Skin is warm and dry.   Psychiatric: He has a normal mood and affect. His behavior is normal.         RECENT LABS:   Lab Results   Component Value Date    WBC 8.72 02/03/2017    HGB 10.9 (L) 02/03/2017    HCT 33.3 (L) 02/03/2017    MCV 93.3 02/03/2017     02/03/2017     Lab Results   Component Value Date    NEUTROABS 6.23 02/03/2017         Assessment/Plan     ASSESSMENT:    1. Recurrent squamous cell lung cancer in the right upper lobe with shotty mediastinal adenopathy.  primary lung mass biopsy sample came back strongly positive for PD-L1 expression in the 90%. His peripheral blood sample was tested negative for any drugable gene mutation by NGS method.    Patient was started on Keytruda on 01/09/2017, tolerated very well with no apparent side effects.  We'll go ahead to give him second dose.  He will return in 3 weeks for dose #3.  Plan to have repeated CT scan for chest after third of dose, which would be about 5 weeks from now.  Discussed with the patient and his wife, they voiced understanding and agreeable.       2.  Anemia, relatively stable. Laboratory studies in November 2016 showed no evidence for deficiency of iron, B12, or folic acid. He does have stage III chronic renal insufficiency, but he is not a candidate for Procrit injection at this point.  We will continue to monitor.      Cancer related pain: Mild and controled.  He has been using hydrocodone 5/325, 1 every 6 hours as needed.             PLAN:    1.  Proceed with cycle #2 Keytruda today.   2.  Return in 3 weeks withattention to visit, laboratory study and cycle #3  Keytruda.  3.  Plan for repeated the CT of the chest in about a 5 weeks from now (2 weeks after cycle 3 treatment).          GHADA NAYAK M.D., Ph.D.    02/03/2017        Dragon disclaimer:  Much of this encounter note is an electronic transcription/translation of spoken language to printed text. The electronic translation of spoken language may permit erroneous, or at times, nonsensical words or phrases to be inadvertently transcribed; Although I have reviewed the note for such errors, some may still exist.

## 2017-02-11 NOTE — PROGRESS NOTES
"This patient calls with swelling in both of his ankle that dose reduce when he \"keeps them up\". I have asked him to do so (elevate when at rest) and if they worsen despite this for us to see him at the beginning of the week. He would also benefit from support stockings"

## 2017-02-23 NOTE — TELEPHONE ENCOUNTER
Kathy Garcia, NP with [phone], called as a FYI that she will see patient at home as a benefit with his insurance since patient has cancer.  She wanted to let Dr. Sosa know that she will not be taking over patient's care but this is a benefit to the patient.  She will send documentation of her visits with patient.

## 2017-02-24 NOTE — PROGRESS NOTES
REASONS FOR FOLLOWUP:     1. 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     2. The patient' s case was discussed at Multimodality Conference, consensus for radiation therapy only. This patient was seen by Dr. Roberson, undergoing radiation therapy, finished by 03/03/2016.     3. Mild anemia with no evidence of deficiency of iron, B12 or folic acid.     4. Repeated CT scan for chest with IV contrast on June 23 of 2016 reported a growing right lower lobe pulmonary nodule at 1.2 cm. The previously radiated pulmonary nodule in the right upper lobe has shrank in size.    5. Pneumonia/COPD exacerbation in November 2016 required hospitalization, CT scan showed disease progress in chest. PET scan on 12/01/16, reporting local recurrence without metastatic disease.    6. Test of original tumor sample reported high-level expression of PD-L1 (Keytruda) at 90%. Liquid biopsy was negative for drugable gene mutation.   7. Patient initiated on treatment with Keytruda on 1/9/2017.     HISTORY OF PRESENT ILLNESS:    The patient is a pleasant 86-year-old gentleman with the above-mentioned history, here today in anticipation of his third cycle of Keytruda.  He continues to tolerate treatment remarkably well.  He denies any nausea or vomiting, diarrhea or constipation.  He denies any fevers or chills.  His shortness of breath remains unchanged, utilizing supplemental oxygen 2 L.  He denies cough.  He denies any new pain.  He continues to have an ECOG performance status of 2.  Blood counts remain stable, and adequate for treatment today.  We do plan to obtain CT scan following cycle 3 to evaluate disease response.    Past Medical History   Diagnosis Date   • Anemia    • Arthritis    • Atrial fibrillation    • Cataract    • Chest pain    • Emphysema of lung    • Fall due to ice or snow    • Hearing deficit    • Hypertension    • Infectious viral hepatitis    • Neoplasm of uncertain behavior of skin 11/2014      right temple scaling lesion measuring 3 mm across.   • Pneumonia      10/2013, 02/2011, 03/2016   • Rales    • Sick sinus syndrome    • Squamous cell carcinoma lung      Right upper lobe     Past Surgical History   Procedure Laterality Date   • Ear tubes Left    • Pacemaker implantation Left 2005   • Cataract extraction Left    • Cataract extraction Right    • Bronchoscopy  02/2013     right lung abscesses, negative for maligancy   • Hernia repair Right 12/2013     Inguinal hernia     Social History   Substance Use Topics   • Smoking status: Current Every Day Smoker   • Smokeless tobacco: Never Used   • Alcohol use Yes      Comment: 1 drink per day      Family History   Problem Relation Age of Onset   • Cancer Other    • Heart disease Other    • Breast cancer Sister 45   • Lung cancer Sister 72   • Heart disease Sister    • Cancer Sister 73     Colon cancer   • Lung cancer Brother 70   • Lung cancer Brother 75     I have reviewed the patient's medical history in detail and updated the computerized patient record.     Review of Systems   Constitutional:  Negative for activity change, chills, fever and unexpected weight change.   HENT: Negative. Negative for mouth sores, nosebleeds and trouble swallowing.   Respiratory: Positive for exertional shortness of breath, but no worse. Negative for cough.   Cardiovascular: Negative. Negative for chest pain and leg swelling.   Gastrointestinal: Negative. Negative for abdominal pain, constipation, diarrhea, nausea and vomiting.   Genitourinary: Negative. Negative for difficulty urinating.   Musculoskeletal: Chronic joint achiness.  Skin: Negative. Negative for rash.   Neurological: Negative.   Hematological: Negative. Negative for adenopathy. Does not bruise/bleed easily.   Psychiatric/Behavioral: Negative.          Medications: The current medication list was reviewed in the EMR     ALLERGIES: No Known Allergies     Objective      Vitals:    02/24/17 0941   BP: 108/70  "  Pulse: 84   Resp: 20   Temp: 97.6 °F (36.4 °C)   TempSrc: Oral   SpO2: 95%  Comment: 2L   Weight: 141 lb (64 kg)   Height: 69.02\" (175.3 cm)   PainSc: 0-No pain   ECOG 2         Physical Exam   Constitutional:  He appears well-developed. No distress.   Walking with a walker, wearing oxygen per nasal cannula at 2L.    HENT:   Head: Normocephalic and atraumatic.   Nose: Nose normal.   Eyes: Pupils are equal, round, and reactive to light.   Neck: Normal range of motion. Neck supple.   Cardiovascular: Normal rate, regular rhythm and normal heart sounds.   Pulmonary/Chest: Diminished breathing sounds normal. No respiratory distress.  He has no wheezes, no rhonchi, no rales.   Abdominal: Soft. Normal appearance and bowel sounds are normal. He exhibits no distension.  There is no tenderness.   Musculoskeletal: Normal range of motion. He exhibits no edema.   Neurological: He is alert and oriented to person, place, and time.   Skin: Skin is warm and dry.   Psychiatric: He has a normal mood and affect. His behavior is normal.       RECENT LABS:   Lab Results   Component Value Date    WBC 7.37 02/24/2017    HGB 12.7 (L) 02/24/2017    HCT 39.1 (L) 02/24/2017    MCV 94.7 02/24/2017     02/24/2017     Lab Results   Component Value Date    NEUTROABS 5.46 02/24/2017         Lab Results   Component Value Date    GLUCOSE 136 (H) 02/24/2017    BUN 26 (H) 02/24/2017    CREATININE 1.13 02/24/2017    EGFRIFNONA 62 02/24/2017    EGFRIFAFRI 66 09/15/2016    BCR 23.0 02/24/2017    K 4.8 02/24/2017    CO2 29.7 (H) 02/24/2017    CALCIUM 9.7 02/24/2017    PROTENTOTREF 7.7 09/15/2016    ALBUMIN 4.10 02/24/2017    LABIL2 1.1 02/24/2017    AST 16 02/24/2017    ALT 14 02/24/2017     Assessment/Plan   ASSESSMENT:    1. Recurrent squamous cell lung cancer in the right upper lobe with shotty mediastinal adenopathy.  primary lung mass biopsy sample came back strongly positive for PD-L1 expression in the 90%. His peripheral blood sample was " tested negative for any drugable gene mutation by NGS method.    Patient was started on Keytruda on 01/09/2017, tolerated very well with no apparent side effects.  We will proceed with cycle 3 today.  We will obtain CT of the chest following this treatment to evaluate disease response.       2.  Anemia, relatively stable. Laboratory studies in November 2016 showed no evidence for deficiency of iron, B12, or folic acid. He does have stage III chronic renal insufficiency, but he is not a candidate for Procrit injection at this point.  We will continue to monitor.      3. Cancer related pain: Mild and controled.  He has been using hydrocodone 5/325, 1 every 6 hours as needed.         PLAN:    1.  Proceed with cycle #3 Keytruda today.   2.  CT of the chest in 2 weeks to evaluate disease response following 3 cycles of treatment.  3.  Follow-up with Dr. Killian in 3 weeks, at which time the patient would be due for his fourth cycle of Keytruda.  At that time, they will review CT scans.    Maribel Means, APRN  02/24/2017

## 2017-03-17 NOTE — PROGRESS NOTES
REASONS FOR FOLLOWUP:    1. 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    2. The patient' s case was discussed at Multimodality Conference, consensus for radiation therapy only. This patient was seen by Dr. Roberson, undergoing radiation therapy, finished by 03/03/2016.    3. Mild anemia with no evidence of deficiency of iron, B12 or folic acid.    4. Repeated CT scan for chest with IV contrast on June 23 of 2016 reported a growing right lower lobe pulmonary nodule at 1.2 cm. The previously radiated pulmonary nodule in the right upper lobe has shrank in size.   5. Pneumonia/COPD exacerbation in November 2016 required hospitalization, CT scan showed disease progress in chest. PET scan on 12/01/16, reporting local recurrence without metastatic disease.   6. Test of original tumor sample reported high-level expression of PD-L1 (Keytruda) at 90%. Liquid biopsy was negative for drugable gene mutation.  7. Patient initiated on treatment with Keytruda on 1/9/2017.  Repeat every 3 weeks.   8.  After 3 cycles Keytruda, CT scan examination on March 10, 2017, reported shrinking of the pulmonary mass.      HISTORY OF PRESENT ILLNESS:   The patient is a pleasant 86-year-old gentleman with the above-mentioned history, presented today for reevaluation, to discuss the CT scan results and further treatment plan.  Patient is accompanied by his daughter.    Patient reports he continues to have fatigue.  Also for the positive for 5 weeks, he has occasional blurriness of vision, however not persistent and no deteriorating of vision.  He has no other apparent side effects.  Denies headache nausea vomiting, no skin rashes.  He does have some weight loss, compared to November 2016, about 9-10 pound weight loss.  Patient to use Ensure 1 can per day.     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  His dyspnea has not getting any worse.  His performance status ECOG 2.    His CT scan  last week on March 10, 2017 reported a favorable response, with shrinking of the large pulmonary mass.  See images section.       Medical History          Past Medical History   Diagnosis Date   • Anemia     • Arthritis     • Atrial fibrillation     • Cataract     • Chest pain     • Emphysema of lung     • Fall due to ice or snow     • Hearing deficit     • Hypertension     • Infectious viral hepatitis     • Neoplasm of uncertain behavior of skin 11/2014       right temple scaling lesion measuring 3 mm across.   • Pneumonia         10/2013, 02/2011, 03/2016   • Rales     • Sick sinus syndrome     • Squamous cell carcinoma lung         Right upper lobe          Surgical History           Past Surgical History   Procedure Laterality Date   • Ear tubes Left     • Pacemaker implantation Left 2005   • Cataract extraction Left     • Cataract extraction Right     • Bronchoscopy   02/2013       right lung abscesses, negative for maligancy   • Hernia repair Right 12/2013       Inguinal hernia                 Social History    Substance Use Topics    • Smoking status: Current Every Day Smoker    • Smokeless tobacco: Never Used    • Alcohol use Yes         Comment: 1 drink per day               Family History   Problem Relation Age of Onset   • Cancer Other     • Heart disease Other     • Breast cancer Sister 45   • Lung cancer Sister 72   • Heart disease Sister     • Cancer Sister 73       Colon cancer   • Lung cancer Brother 70   • Lung cancer Brother 75      I have reviewed the patient's medical history in detail and updated the computerized patient record.     Review of Systems   Constitutional: Negative for activity change, chills, fever and unexpected weight change.   HENT: Negative. Negative for mouth sores, nosebleeds and trouble swallowing.  See history of present illness.   Respiratory: Positive for exertional shortness of breath, but no worse. Negative for cough.   Cardiovascular: Negative for chest pain and leg  "swelling.   Gastrointestinal:  Negative for abdominal pain, constipation, diarrhea, nausea and vomiting.   Genitourinary: Negative. Negative for difficulty urinating.   Musculoskeletal: Chronic joint achiness.  Skin: Negative for rash.   Neurological: Negative.   Hematological: Negative. Negative for adenopathy. Does not bruise/bleed easily.   Psychiatric/Behavioral: Negative.           Medications: The current medication list was reviewed in the EMR      ALLERGIES: No Known Allergies      Objective       Vitals:    03/17/17 1002   BP: 127/77   Pulse: 90   Resp: 18   Temp: 97.6 °F (36.4 °C)   SpO2: 98%  Comment: 2L   Weight: 142 lb (64.4 kg)   Height: 69.02\" (175.3 cm)   PainSc: 0-No pain   ECOG 2           Physical Exam   Constitutional: He appears well-developed. No distress.   Walking with a walker, wearing oxygen per nasal cannula at 2L.    HENT:   Head: Normocephalic and atraumatic.   Nose: Nose normal.   Eyes: Pupils are equal, round, and reactive to light.   Neck: Normal range of motion. Neck supple.   Cardiovascular: Normal rate, regular rhythm and normal heart sounds.   Pulmonary/Chest: Diminished breathing sounds normal. No respiratory distress. He has no wheezes, no rhonchi, no rales.   Abdominal: Soft. Normal appearance and bowel sounds are normal. He exhibits no distension. There is no tenderness.   Musculoskeletal: Normal range of motion. He exhibits no edema.   Neurological: He is alert and oriented to person, place, and time.   Skin: Skin is warm and dry.   Psychiatric: He has a normal mood and affect. His behavior is normal.       RECENT LABS:        Lab Results   Component Value Date    WBC 7.38 03/17/2017    HGB 12.5 (L) 03/17/2017    HCT 39.3 (L) 03/17/2017    MCV 95.4 03/17/2017     03/17/2017     Lab Results   Component Value Date    NEUTROABS 5.48 03/17/2017     Assessment/Plan   ASSESSMENT:    1. Recurrent squamous cell lung cancer in the right upper lobe with shotty mediastinal " adenopathy.  His tumor is strongly positive for PD-L1 expression in the 90%. His peripheral blood sample was tested negative for any drugable gene mutation by NGS method.     Patient was started on Keytruda on 01/09/2017, tolerated very well with no apparent side effects.  He had 3 cycles, and we obtained CT scan for chest which reported shrinkage of the large lung mass, I personally reviewed CT images.  This large mass not only shrinking in size as reported; by my evaluation, this tumor also is much less in density.  This certainly is very encouraging and the patient is very happy with the results.  We will continue treatment.  Plan to repeat CT scan after 3-4 cycles.       2. 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 Laboratory studies in November 2016 showed no evidence for deficiency of iron, B12, or folic acid. He does have stage III chronic renal insufficiency, but he is not a candidate for Procrit injection at this point. We will continue to monitor.      3. Cancer related pain: Mild and controled. He has been using hydrocodone 5/325, 1 every 6 hours as needed.     4.  Weight loss.  This is likely related to his malignancy and supple, nutrition support.  He only uses 1 cane in Ensure per day.  I told him to increase to 3 canes a day.  He voiced understanding.         PLAN:    1.  Proceed with cycle #4 Keytruda today.   2.  MD reevaluation in 3 weeks, for continued Keytruda treatment.       40 minutes, over half of that time were counseling.    GHADA NYAAK M.D., Ph.D.      03/17/2017        Dragon disclaimer:  Much of this encounter note is an electronic transcription/translation of spoken language to printed text. The electronic translation of spoken language may permit erroneous, or at times, nonsensical words or phrases to be inadvertently transcribed; Although I have reviewed the note for such errors, some may still exist.

## 2017-04-07 NOTE — PROGRESS NOTES
REASONS FOR FOLLOWUP:    1. 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    2. The patient' s case was discussed at Multimodality Conference, consensus for radiation therapy only. This patient was seen by Dr. Roberson, undergoing radiation therapy, finished by 03/03/2016.    3. Mild anemia with no evidence of deficiency of iron, B12 or folic acid.    4. Repeated CT scan for chest with IV contrast on June 23 of 2016 reported a growing right lower lobe pulmonary nodule at 1.2 cm. The previously radiated pulmonary nodule in the right upper lobe has shrank in size.   5. Pneumonia/COPD exacerbation in November 2016 required hospitalization, CT scan showed disease progress in chest. PET scan on 12/01/16, reporting local recurrence without metastatic disease.   6. Test of original tumor sample reported high-level expression of PD-L1 (Keytruda) at 90%. Liquid biopsy was negative for drugable gene mutation.  7. Patient initiated on treatment with Keytruda on 1/9/2017.  Repeat every 3 weeks.   8.  After 3 cycles Keytruda, CT scan examination on March 10, 2017, reported shrinking of the pulmonary mass.  9.  Patient reviewed April 07, 2017 tolerating Keytruda without side effect      HISTORY OF PRESENT ILLNESS:   The patient is a pleasant 86-year-old gentleman with the above-mentioned history, presented today for reevaluation, to discuss the CT scan results and further treatment plan.  Patient is accompanied by his daughter.    Patient reports he continues to have fatigue.  Also for the positive for 5 weeks, he has occasional blurriness of vision, however not persistent and no deteriorating of vision.  He has no other apparent side effects.  Denies headache nausea vomiting, no skin rashes.  He does have some weight loss, compared to November 2016, about 9-10 pound weight loss.  Patient to use Ensure 1 can per day.     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  His  dyspnea has not getting any worse.  His performance status ECOG 2.    His CT scan last week on March 10, 2017 reported a favorable response, with shrinking of the large pulmonary mass.  See images section.     The patient is reviewed April 07, 2017.  He has not been seen by this physician for some time after previous hospitalization and his performance status is considerably improved.  We reviewed his tolerance of Keytruda thus far and there is no significant side effect which he is experiencing.      Medical History          Past Medical History   Diagnosis Date   • Anemia     • Arthritis     • Atrial fibrillation     • Cataract     • Chest pain     • Emphysema of lung     • Fall due to ice or snow     • Hearing deficit     • Hypertension     • Infectious viral hepatitis     • Neoplasm of uncertain behavior of skin 11/2014       right temple scaling lesion measuring 3 mm across.   • Pneumonia         10/2013, 02/2011, 03/2016   • Rales     • Sick sinus syndrome     • Squamous cell carcinoma lung         Right upper lobe          Surgical History           Past Surgical History   Procedure Laterality Date   • Ear tubes Left     • Pacemaker implantation Left 2005   • Cataract extraction Left     • Cataract extraction Right     • Bronchoscopy   02/2013       right lung abscesses, negative for maligancy   • Hernia repair Right 12/2013       Inguinal hernia                 Social History    Substance Use Topics    • Smoking status: Current Every Day Smoker    • Smokeless tobacco: Never Used    • Alcohol use Yes         Comment: 1 drink per day               Family History   Problem Relation Age of Onset   • Cancer Other     • Heart disease Other     • Breast cancer Sister 45   • Lung cancer Sister 72   • Heart disease Sister     • Cancer Sister 73       Colon cancer   • Lung cancer Brother 70   • Lung cancer Brother 75      I have reviewed the patient's medical history in detail and updated the computerized patient  "record.     Review of Systems   Constitutional: Negative for activity change, chills, fever and unexpected weight change.   HENT: Negative. Negative for mouth sores, nosebleeds and trouble swallowing.  See history of present illness.   Respiratory: Positive for exertional shortness of breath, but no worse. Negative for cough.   Cardiovascular: Negative for chest pain and leg swelling.   Gastrointestinal:  Negative for abdominal pain, constipation, diarrhea, nausea and vomiting.   Genitourinary: Negative. Negative for difficulty urinating.   Musculoskeletal: Chronic joint achiness.  Skin: Negative for rash.   Neurological: Negative.   Hematological: Negative. Negative for adenopathy. Does not bruise/bleed easily.   Psychiatric/Behavioral: Negative.           Medications: The current medication list was reviewed in the EMR      ALLERGIES: No Known Allergies      Objective       Vitals:    04/07/17 1046   BP: 134/82   Pulse: 85   Resp: 16   Weight: 145 lb 8 oz (66 kg)  Comment: weighed with a jacket on   Height: 69.02\" (175.3 cm)   PainSc: 0-No pain   ECOG 2           Physical Exam   Constitutional: He appears well-developed. No distress.   Walking with a walker, wearing oxygen per nasal cannula at 2L.    HENT:   Head: Normocephalic and atraumatic.   Nose: Nose normal.   Eyes: Pupils are equal, round, and reactive to light.   Neck: Normal range of motion. Neck supple.   Cardiovascular: Normal rate, regular rhythm and normal heart sounds.   Pulmonary/Chest: Diminished breathing sounds normal. No respiratory distress. He has facial wheezes now noted left upper lung field without consolidation  Abdominal: Soft. Normal appearance and bowel sounds are normal. He exhibits no distension. There is no tenderness.   Musculoskeletal: Normal range of motion. He exhibits no edema.   Neurological: He is alert and oriented to person, place, and time.   Skin: Skin is warm and dry.   Psychiatric: He has a normal mood and affect. His " behavior is normal.       RECENT LABS:        Lab Results   Component Value Date    WBC 7.38 03/17/2017    HGB 12.5 (L) 03/17/2017    HCT 39.3 (L) 03/17/2017    MCV 95.4 03/17/2017     03/17/2017     Lab Results   Component Value Date    NEUTROABS 5.48 03/17/2017     Assessment/Plan   ASSESSMENT:    1. Recurrent squamous cell lung cancer in the right upper lobe with shotty mediastinal adenopathy.  His tumor is strongly positive for PD-L1 expression in the 90%. His peripheral blood sample was tested negative for any drugable gene mutation by NGS method.     Patient was started on Keytruda on 01/09/2017, tolerated very well with no apparent side effects.  He had 3 cycles, and we obtained CT scan for chest which reported shrinkage of the large lung mass, I personally reviewed CT images.  This large mass not only shrinking in size as reported; by my evaluation, this tumor also is much less in density.  This certainly is very encouraging and the patient is very happy with the results.  We will continue treatment.  Plan to repeat CT scan after 3 additional cycles of treatment.  The patient is now seen for his fifth treatment and we would anticipate rescanning him likely after his seventh.  He is agreeable to this follow-up and plan.     2. 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 Laboratory studies in November 2016 showed no evidence for deficiency of iron, B12, or folic acid. He does have stage III chronic renal insufficiency, but he is not a candidate for Procrit injection at this point. We will continue to monitor.      3. Cancer related pain: Mild and controled. He has been using hydrocodone 5/325, 1 every 6 hours as needed.     4.  Weight loss.  This is likely related to his malignancy and supple, nutrition support.  He has been able to stabilize his weight at this point.         PLAN:    1.  Proceed with cycle #5 Keytruda today.   2.  MD  reevaluation in 3 weeks, for continued Keytruda treatment.

## 2017-04-19 NOTE — TELEPHONE ENCOUNTER
Patient called asking if he would be able to start getting his protime done in this office.  He has a pacemaker.  Please advise.

## 2017-04-20 NOTE — TELEPHONE ENCOUNTER
Patient stated that he gets his INR done at the [facility].  He is worried about having to pay for an office visit if he has this done wither here or through [facility] next door.  He decided to continue to have his INR done through [facility].

## 2017-04-20 NOTE — TELEPHONE ENCOUNTER
If his INR is monitored by hematology recommend having him drawn [facility] [place] and have results sent to hematologist

## 2017-04-30 PROBLEM — R19.7 DIARRHEA: Status: ACTIVE | Noted: 2017-01-01

## 2017-04-30 NOTE — PROGRESS NOTES
Patient record complains continued diarrhea, he was taking Lomotil 2 tablets 4 times a day, but are not combined with Imodium in the day.  He is able to drinking properly.  I called his pharmacy [phone] and a spoke to pharmacist, ordering Lomotil 40 tablets 2 tablets 4 times a day.  I also give him 1 refill.  I spoke to patient asked him to also use Imodium throughout the day.    GHADA NAYAK M.D.

## 2017-05-05 PROBLEM — J96.21 ACUTE ON CHRONIC RESPIRATORY FAILURE WITH HYPOXIA (HCC): Status: ACTIVE | Noted: 2017-01-01

## 2017-05-08 LAB
BACTERIA SPEC AEROBE CULT: ABNORMAL
GRAM STN SPEC: ABNORMAL
GRAM STN SPEC: ABNORMAL

## 2017-05-12 ENCOUNTER — APPOINTMENT (OUTPATIENT)
Dept: CT IMAGING | Facility: HOSPITAL | Age: 82
End: 2017-05-12
Attending: INTERNAL MEDICINE

## 2017-05-19 ENCOUNTER — APPOINTMENT (OUTPATIENT)
Dept: ONCOLOGY | Facility: HOSPITAL | Age: 82
End: 2017-05-19

## 2021-01-01 NOTE — PROGRESS NOTES
[phone]  Onset: Two days  Location / description: Spitting up after each feeding.  Spits everything he eats full force coming thru nose and mouth.  Milk that he is drinking.  Associated Symptoms:  N-g tube.    What improves / worsens symptoms: Worsening symptoms.  Drinks 2 oz and 2 oz given via n-g tube.  Changed n-tube yesterday was checked with stethatoscopy by dad placement is correct.    Symptom specific medications:  None  Intake and Output (if dehydration suspected, use .AAHNTDEHYRDATION):    Last void six times per day  Is normal and BM is normal.   Activity level:  Sleeping.    Temperature (route and time):  None  Weight: No data recorded  Recent visits (last 3-4 weeks) for same reason or recent surgery: None    PLAN:   Directed to Emergency Department  Paged on call to advise.    Paged PCP  Patient/Caller agrees to follow recommendations.    Reason for Disposition  • Vomiting (forceful throwing up of large amount)  • Severe dehydration suspected (very dizzy when tries to stand or has fainted)    Protocols used: SPITTING UP (REFLUX)-P-AH, VOMITING WITHOUT DIARRHEA-P-AH    -- DO NOT REPLY / DO NOT REPLY ALL --  -- Message is from the [facility]--    Provider paged via [employer] Documentation - The below message was copied and pasted from a [employer] page:      2021 11:29:28 AM   [facility] Center[facility] [phone]   Nurse Triage, [facility] 0\" tooltip-popup-delay=\"500\" jesddex-oexyxd-ua-body=\"true\" uib-tooltip=\"\" tooltip-placement=\"auto top\" tooltip-enable=\"false\" psx-ellipsis=\"\"Nory Genao MD   Secure Text   [phone] [facility] URGENT CALLER NAME: ULI BRADLEY DAD REQUESTED PHYSICIAN: NORY GENAO RE: FORREST BRADLEY PATIENT 2021 PATIENT PCP: DR. NORY GENAO ACC TRIAGE; ED DISPOSITION; PLEASE ADVISE SEVERE PROJECTIVE; VOMITING WITH EVERY FEED COMING FROM NOSE AND MOUTH; HAS N_G TUBE; PLEASE ADVISE. THANK YOU. [facility]  REASONS FOR FOLLOWUP:    1. 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    2. The patient' s case was discussed at Multimodality Conference, consensus for radiation therapy only. This patient was seen by Dr. Roberson, undergoing radiation therapy, finished by 03/03/2016.    3. Mild anemia with no evidence of deficiency of iron, B12 or folic acid.    4. Repeated CT scan for chest with IV contrast on June 23 of 2016 reported a growing right lower lobe pulmonary nodule at 1.2 cm. The previously radiated pulmonary nodule in the right upper lobe has shrank in size.   5. Pneumonia/COPD exacerbation in November 2016 required hospitalization, CT scan showed disease progress in chest. PET scan on 12/01/16, reporting local recurrence without metastatic disease.   6. Test of original tumor sample reported high-level expression of PD-L1 (Keytruda) at 90%. Liquid biopsy was negative for drugable gene mutation.  7. Patient initiated on treatment with Keytruda on 1/9/2017.  Repeat every 3 weeks.   8.  After 3 cycles Keytruda, CT scan examination on March 10, 2017, reported shrinking of the pulmonary mass.  9.  Patient reviewed April 07, 2017 tolerating Keytruda without side effect      HISTORY OF PRESENT ILLNESS:   The patient is a pleasant 86-year-old gentleman with the above-mentioned history, presented today for reevaluation and further palliative Keytruda therapy.  Patient is accompanied by his daughter.    Patient reports he continues to have fatigue.  Also for the positive for 5 weeks, he has occasional blurriness of vision, however not persistent and no deteriorating of vision.  He has no other apparent side effects.  Denies headache nausea vomiting, no skin rashes.  He does have some weight loss, compared to November 2016, about 9-10 pound weight loss.  Patient to use Ensure 1 can per day.     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  His dyspnea has not getting  RN [facility]   [phone]   any worse.  His performance status ECOG 2.    His CT scan last week on March 10, 2017 reported a favorable response, with shrinking of the large pulmonary mass.  See images section.     The patient is reviewed April 07, 2017.  He has not been seen by this physician for some time after previous hospitalization and his performance status is considerably improved.  We reviewed his tolerance of Keytruda thus far and there is no significant side effect which he is experiencing.      Medical History          Past Medical History   Diagnosis Date   • Anemia     • Arthritis     • Atrial fibrillation     • Cataract     • Chest pain     • Emphysema of lung     • Fall due to ice or snow     • Hearing deficit     • Hypertension     • Infectious viral hepatitis     • Neoplasm of uncertain behavior of skin 11/2014       right temple scaling lesion measuring 3 mm across.   • Pneumonia         10/2013, 02/2011, 03/2016   • Rales     • Sick sinus syndrome     • Squamous cell carcinoma lung         Right upper lobe          Surgical History           Past Surgical History   Procedure Laterality Date   • Ear tubes Left     • Pacemaker implantation Left 2005   • Cataract extraction Left     • Cataract extraction Right     • Bronchoscopy   02/2013       right lung abscesses, negative for maligancy   • Hernia repair Right 12/2013       Inguinal hernia                 Social History    Substance Use Topics    • Smoking status: Current Every Day Smoker    • Smokeless tobacco: Never Used    • Alcohol use Yes         Comment: 1 drink per day               Family History   Problem Relation Age of Onset   • Cancer Other     • Heart disease Other     • Breast cancer Sister 45   • Lung cancer Sister 72   • Heart disease Sister     • Cancer Sister 73       Colon cancer   • Lung cancer Brother 70   • Lung cancer Brother 75      I have reviewed the patient's medical history in detail and updated the computerized patient record.     Review of  Systems   Constitutional: Negative for activity change, chills, fever and unexpected weight change.   HENT: Negative. Negative for mouth sores, nosebleeds and trouble swallowing.  See history of present illness.   Respiratory: Positive for exertional shortness of breath, but no worse. Negative for cough.   Cardiovascular: Negative for chest pain and leg swelling.   Gastrointestinal:  Negative for abdominal pain, constipation, diarrhea, nausea and vomiting.   Genitourinary: Negative. Negative for difficulty urinating.   Musculoskeletal: Chronic joint achiness.  Skin: Negative for rash.   Neurological: Negative.   Hematological: Negative. Negative for adenopathy. Does not bruise/bleed easily.   Psychiatric/Behavioral: Negative.           Medications: The current medication list was reviewed in the EMR      ALLERGIES: No Known Allergies      Objective       There were no vitals filed for this visit.ECOG 2           Physical Exam   Constitutional: He appears well-developed. No distress.   Walking with a walker, wearing oxygen per nasal cannula at 2L.    HENT:   Head: Normocephalic and atraumatic.   Nose: Nose normal.   Eyes: Pupils are equal, round, and reactive to light.   Neck: Normal range of motion. Neck supple.   Cardiovascular: Normal rate, regular rhythm and normal heart sounds.   Pulmonary/Chest: Diminished breathing sounds normal. No respiratory distress. He has facial wheezes now noted left upper lung field without consolidation  Abdominal: Soft. Normal appearance and bowel sounds are normal. He exhibits no distension. There is no tenderness.   Musculoskeletal: Normal range of motion. He exhibits no edema.   Neurological: He is alert and oriented to person, place, and time.   Skin: Skin is warm and dry.   Psychiatric: He has a normal mood and affect. His behavior is normal.       RECENT LABS:        Lab Results   Component Value Date    WBC 12.23 (H) 04/07/2017    HGB 11.1 (L) 04/07/2017    HCT 34.5 (L)  04/07/2017    MCV 94.5 04/07/2017     04/07/2017     Lab Results   Component Value Date    NEUTROABS 10.45 (H) 04/07/2017     Assessment/Plan   ASSESSMENT:    1. Recurrent squamous cell lung cancer in the right upper lobe with shotty mediastinal adenopathy.  His tumor is strongly positive for PD-L1 expression in the 90%. His peripheral blood sample was tested negative for any drugable gene mutation by NGS method.     Patient was started on Keytruda on 01/09/2017, tolerated very well with no apparent side effects.   The patient is now seen for his sixth treatment and we would anticipate rescanning him likely after his seventh.  He is agreeable to this follow-up and plan.     2. 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 Laboratory studies in November 2016 showed no evidence for deficiency of iron, B12, or folic acid. He does have stage III chronic renal insufficiency, but he is not a candidate for Procrit injection at this point. We will continue to monitor.      3. Cancer related pain: Mild and controled. He has been using hydrocodone 5/325, 1 every 6 hours as needed.     4.  Weight loss.  This is likely related to his malignancy and supple, nutrition support.  He has been able to stabilize his weight at this point.         PLAN:    1.  Proceed with cycle #6 Keytruda today.   2.  MD reevaluation in 3 weeks, for continued Keytruda treatment.  Scans will be ordered after this cycle of Keytruda.